# Patient Record
Sex: MALE | Race: WHITE | HISPANIC OR LATINO | URBAN - METROPOLITAN AREA
[De-identification: names, ages, dates, MRNs, and addresses within clinical notes are randomized per-mention and may not be internally consistent; named-entity substitution may affect disease eponyms.]

---

## 2018-06-01 ENCOUNTER — EMERGENCY (EMERGENCY)
Facility: HOSPITAL | Age: 38
LOS: 1 days | Discharge: HOME | End: 2018-06-01
Payer: MEDICAID

## 2018-06-01 VITALS — WEIGHT: 149.91 LBS

## 2018-06-01 VITALS
DIASTOLIC BLOOD PRESSURE: 88 MMHG | TEMPERATURE: 98 F | HEART RATE: 87 BPM | OXYGEN SATURATION: 96 % | RESPIRATION RATE: 16 BRPM | SYSTOLIC BLOOD PRESSURE: 132 MMHG

## 2018-06-01 DIAGNOSIS — Z98.89 OTHER SPECIFIED POSTPROCEDURAL STATES: Chronic | ICD-10-CM

## 2018-06-01 DIAGNOSIS — S01.01XA LACERATION WITHOUT FOREIGN BODY OF SCALP, INITIAL ENCOUNTER: Chronic | ICD-10-CM

## 2018-06-01 DIAGNOSIS — M75.102 UNSPECIFIED ROTATOR CUFF TEAR OR RUPTURE OF LEFT SHOULDER, NOT SPECIFIED AS TRAUMATIC: Chronic | ICD-10-CM

## 2018-06-01 PROCEDURE — 99283 EMERGENCY DEPT VISIT LOW MDM: CPT

## 2018-06-01 PROCEDURE — T1013: CPT

## 2018-06-01 NOTE — ED ADULT NURSE REASSESSMENT NOTE - NS ED NURSE REASSESS COMMENT FT1
Report recvd from off going RN, pt rec d standing at nursing station requesting to see Dr. ASAP, wait time explained to pt, pt assisted back to area, cooperative to care.

## 2018-06-02 NOTE — ED PROVIDER NOTE - ATTENDING CONTRIBUTION TO CARE
38y old from home with complaints of ear pain, with blood in canal, most likely from truama, plan to debrox dropa and close follow up with ent

## 2018-06-02 NOTE — ED PROVIDER NOTE - OBJECTIVE STATEMENT
37 y/o M c/o bleeding from left ear x 3 days.  Patient denies trauma or foreign body.  Denies fever.

## 2018-06-02 NOTE — ED ADULT NURSE REASSESSMENT NOTE - NS ED NURSE REASSESS COMMENT FT1
Pt returned to ED at this time after several; attempts at locating pt, pt found outside on bench talking on cell phone asked to return to dept at this time for treatment, practitioners made aware pt is back.

## 2018-08-19 ENCOUNTER — INPATIENT (INPATIENT)
Facility: HOSPITAL | Age: 38
LOS: 3 days | Discharge: HOME | DRG: 556 | End: 2018-08-23
Attending: INTERNAL MEDICINE | Admitting: INTERNAL MEDICINE
Payer: MEDICAID

## 2018-08-19 VITALS — WEIGHT: 139.99 LBS | HEIGHT: 64 IN

## 2018-08-19 DIAGNOSIS — R29.898 OTHER SYMPTOMS AND SIGNS INVOLVING THE MUSCULOSKELETAL SYSTEM: ICD-10-CM

## 2018-08-19 DIAGNOSIS — S01.01XA LACERATION WITHOUT FOREIGN BODY OF SCALP, INITIAL ENCOUNTER: Chronic | ICD-10-CM

## 2018-08-19 DIAGNOSIS — Z98.89 OTHER SPECIFIED POSTPROCEDURAL STATES: Chronic | ICD-10-CM

## 2018-08-19 DIAGNOSIS — M75.102 UNSPECIFIED ROTATOR CUFF TEAR OR RUPTURE OF LEFT SHOULDER, NOT SPECIFIED AS TRAUMATIC: Chronic | ICD-10-CM

## 2018-08-19 LAB
ALBUMIN SERPL ELPH-MCNC: 4.6 G/DL — SIGNIFICANT CHANGE UP (ref 3.3–5.2)
ALP SERPL-CCNC: 50 U/L — SIGNIFICANT CHANGE UP (ref 40–120)
ALT FLD-CCNC: 33 U/L — SIGNIFICANT CHANGE UP
AMPHET UR-MCNC: NEGATIVE — SIGNIFICANT CHANGE UP
ANION GAP SERPL CALC-SCNC: 13 MMOL/L — SIGNIFICANT CHANGE UP (ref 5–17)
ANISOCYTOSIS BLD QL: SLIGHT — SIGNIFICANT CHANGE UP
APPEARANCE CSF: CLEAR — SIGNIFICANT CHANGE UP
APPEARANCE UR: CLEAR — SIGNIFICANT CHANGE UP
APTT BLD: 34.5 SEC — SIGNIFICANT CHANGE UP (ref 27.5–37.4)
AST SERPL-CCNC: 38 U/L — SIGNIFICANT CHANGE UP
BACTERIA # UR AUTO: ABNORMAL
BARBITURATES UR SCN-MCNC: NEGATIVE — SIGNIFICANT CHANGE UP
BENZODIAZ UR-MCNC: NEGATIVE — SIGNIFICANT CHANGE UP
BILIRUB SERPL-MCNC: 0.5 MG/DL — SIGNIFICANT CHANGE UP (ref 0.4–2)
BILIRUB UR-MCNC: NEGATIVE — SIGNIFICANT CHANGE UP
BUN SERPL-MCNC: 10 MG/DL — SIGNIFICANT CHANGE UP (ref 8–20)
CALCIUM SERPL-MCNC: 9.6 MG/DL — SIGNIFICANT CHANGE UP (ref 8.6–10.2)
CHLORIDE SERPL-SCNC: 100 MMOL/L — SIGNIFICANT CHANGE UP (ref 98–107)
CO2 SERPL-SCNC: 27 MMOL/L — SIGNIFICANT CHANGE UP (ref 22–29)
COCAINE METAB.OTHER UR-MCNC: NEGATIVE — SIGNIFICANT CHANGE UP
COLOR CSF: SIGNIFICANT CHANGE UP
COLOR SPEC: YELLOW — SIGNIFICANT CHANGE UP
CREAT SERPL-MCNC: 0.65 MG/DL — SIGNIFICANT CHANGE UP (ref 0.5–1.3)
CSF PCR RESULT: SIGNIFICANT CHANGE UP
DIFF PNL FLD: ABNORMAL
EOSINOPHIL NFR BLD AUTO: 2 % — SIGNIFICANT CHANGE UP (ref 0–6)
EPI CELLS # UR: SIGNIFICANT CHANGE UP
ETHANOL SERPL-MCNC: <10 MG/DL — SIGNIFICANT CHANGE UP
GLUCOSE CSF-MCNC: 70 MG/DL — SIGNIFICANT CHANGE UP (ref 40–70)
GLUCOSE SERPL-MCNC: 103 MG/DL — SIGNIFICANT CHANGE UP (ref 70–115)
GLUCOSE UR QL: NEGATIVE MG/DL — SIGNIFICANT CHANGE UP
GRAM STN FLD: SIGNIFICANT CHANGE UP
HCT VFR BLD CALC: 44.8 % — SIGNIFICANT CHANGE UP (ref 42–52)
HGB BLD-MCNC: 15.2 G/DL — SIGNIFICANT CHANGE UP (ref 14–18)
HYPOCHROMIA BLD QL: SLIGHT — SIGNIFICANT CHANGE UP
INR BLD: 0.91 RATIO — SIGNIFICANT CHANGE UP (ref 0.88–1.16)
KETONES UR-MCNC: ABNORMAL
LEUKOCYTE ESTERASE UR-ACNC: NEGATIVE — SIGNIFICANT CHANGE UP
LIDOCAIN IGE QN: 42 U/L — SIGNIFICANT CHANGE UP (ref 22–51)
LYMPHOCYTES # BLD AUTO: 22 % — SIGNIFICANT CHANGE UP (ref 20–55)
MACROCYTES BLD QL: SLIGHT — SIGNIFICANT CHANGE UP
MCHC RBC-ENTMCNC: 33.9 G/DL — SIGNIFICANT CHANGE UP (ref 32–36)
MCHC RBC-ENTMCNC: 35 PG — HIGH (ref 27–31)
MCV RBC AUTO: 103.2 FL — HIGH (ref 80–94)
METHADONE UR-MCNC: NEGATIVE — SIGNIFICANT CHANGE UP
MICROCYTES BLD QL: SLIGHT — SIGNIFICANT CHANGE UP
MONOCYTES NFR BLD AUTO: 14 % — HIGH (ref 3–10)
NEUTROPHILS # CSF: SIGNIFICANT CHANGE UP %
NEUTROPHILS NFR BLD AUTO: 60 % — SIGNIFICANT CHANGE UP (ref 37–73)
NITRITE UR-MCNC: NEGATIVE — SIGNIFICANT CHANGE UP
NRBC NFR CSF: 1 /UL — SIGNIFICANT CHANGE UP (ref 0–5)
NT-PROBNP SERPL-SCNC: 12 PG/ML — SIGNIFICANT CHANGE UP (ref 0–300)
OPIATES UR-MCNC: NEGATIVE — SIGNIFICANT CHANGE UP
PCP SPEC-MCNC: SIGNIFICANT CHANGE UP
PCP UR-MCNC: NEGATIVE — SIGNIFICANT CHANGE UP
PH UR: 6.5 — SIGNIFICANT CHANGE UP (ref 5–8)
PLAT MORPH BLD: NORMAL — SIGNIFICANT CHANGE UP
PLATELET # BLD AUTO: 156 K/UL — SIGNIFICANT CHANGE UP (ref 150–400)
POIKILOCYTOSIS BLD QL AUTO: SLIGHT — SIGNIFICANT CHANGE UP
POTASSIUM SERPL-MCNC: 4.1 MMOL/L — SIGNIFICANT CHANGE UP (ref 3.5–5.3)
POTASSIUM SERPL-SCNC: 4.1 MMOL/L — SIGNIFICANT CHANGE UP (ref 3.5–5.3)
PROT CSF-MCNC: 45 MG/DL — SIGNIFICANT CHANGE UP (ref 15–45)
PROT SERPL-MCNC: 7.4 G/DL — SIGNIFICANT CHANGE UP (ref 6.6–8.7)
PROT UR-MCNC: 15 MG/DL
PROTHROM AB SERPL-ACNC: 10 SEC — SIGNIFICANT CHANGE UP (ref 9.8–12.7)
RBC # BLD: 4.34 M/UL — LOW (ref 4.6–6.2)
RBC # CSF: 10 /CMM — HIGH (ref 0–1)
RBC # FLD: 12.8 % — SIGNIFICANT CHANGE UP (ref 11–15.6)
RBC BLD AUTO: ABNORMAL
RBC CASTS # UR COMP ASSIST: ABNORMAL /HPF (ref 0–4)
SODIUM SERPL-SCNC: 140 MMOL/L — SIGNIFICANT CHANGE UP (ref 135–145)
SP GR SPEC: 1.01 — SIGNIFICANT CHANGE UP (ref 1.01–1.02)
SPECIMEN SOURCE: SIGNIFICANT CHANGE UP
THC UR QL: NEGATIVE — SIGNIFICANT CHANGE UP
TROPONIN T SERPL-MCNC: <0.01 NG/ML — SIGNIFICANT CHANGE UP (ref 0–0.06)
TUBE TYPE: SIGNIFICANT CHANGE UP
UROBILINOGEN FLD QL: NEGATIVE MG/DL — SIGNIFICANT CHANGE UP
VARIANT LYMPHS # BLD: 2 % — SIGNIFICANT CHANGE UP (ref 0–6)
WBC # BLD: 3.2 K/UL — LOW (ref 4.8–10.8)
WBC # FLD AUTO: 3.2 K/UL — LOW (ref 4.8–10.8)
WBC UR QL: SIGNIFICANT CHANGE UP

## 2018-08-19 PROCEDURE — 72141 MRI NECK SPINE W/O DYE: CPT | Mod: 26

## 2018-08-19 PROCEDURE — 74177 CT ABD & PELVIS W/CONTRAST: CPT | Mod: 26

## 2018-08-19 PROCEDURE — 71260 CT THORAX DX C+: CPT | Mod: 26

## 2018-08-19 PROCEDURE — 72131 CT LUMBAR SPINE W/O DYE: CPT | Mod: 26

## 2018-08-19 PROCEDURE — 62270 DX LMBR SPI PNXR: CPT

## 2018-08-19 PROCEDURE — 70450 CT HEAD/BRAIN W/O DYE: CPT | Mod: 26

## 2018-08-19 PROCEDURE — 72146 MRI CHEST SPINE W/O DYE: CPT | Mod: 26

## 2018-08-19 PROCEDURE — 72148 MRI LUMBAR SPINE W/O DYE: CPT | Mod: 26

## 2018-08-19 PROCEDURE — 99285 EMERGENCY DEPT VISIT HI MDM: CPT | Mod: 25

## 2018-08-19 PROCEDURE — 72128 CT CHEST SPINE W/O DYE: CPT | Mod: 26

## 2018-08-19 PROCEDURE — 99222 1ST HOSP IP/OBS MODERATE 55: CPT

## 2018-08-19 PROCEDURE — 71045 X-RAY EXAM CHEST 1 VIEW: CPT | Mod: 26

## 2018-08-19 PROCEDURE — 70551 MRI BRAIN STEM W/O DYE: CPT | Mod: 26

## 2018-08-19 RX ORDER — SODIUM CHLORIDE 9 MG/ML
3 INJECTION INTRAMUSCULAR; INTRAVENOUS; SUBCUTANEOUS ONCE
Qty: 0 | Refills: 0 | Status: COMPLETED | OUTPATIENT
Start: 2018-08-19 | End: 2018-08-19

## 2018-08-19 RX ORDER — SODIUM CHLORIDE 9 MG/ML
1000 INJECTION INTRAMUSCULAR; INTRAVENOUS; SUBCUTANEOUS ONCE
Qty: 0 | Refills: 0 | Status: COMPLETED | OUTPATIENT
Start: 2018-08-19 | End: 2018-08-19

## 2018-08-19 RX ORDER — ENOXAPARIN SODIUM 100 MG/ML
40 INJECTION SUBCUTANEOUS EVERY 24 HOURS
Qty: 0 | Refills: 0 | Status: DISCONTINUED | OUTPATIENT
Start: 2018-08-19 | End: 2018-08-23

## 2018-08-19 RX ADMIN — SODIUM CHLORIDE 1000 MILLILITER(S): 9 INJECTION INTRAMUSCULAR; INTRAVENOUS; SUBCUTANEOUS at 12:00

## 2018-08-19 RX ADMIN — SODIUM CHLORIDE 3 MILLILITER(S): 9 INJECTION INTRAMUSCULAR; INTRAVENOUS; SUBCUTANEOUS at 17:09

## 2018-08-19 RX ADMIN — ENOXAPARIN SODIUM 40 MILLIGRAM(S): 100 INJECTION SUBCUTANEOUS at 19:38

## 2018-08-19 RX ADMIN — SODIUM CHLORIDE 1000 MILLILITER(S): 9 INJECTION INTRAMUSCULAR; INTRAVENOUS; SUBCUTANEOUS at 12:50

## 2018-08-19 RX ADMIN — SODIUM CHLORIDE 3 MILLILITER(S): 9 INJECTION INTRAMUSCULAR; INTRAVENOUS; SUBCUTANEOUS at 12:36
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
## 2018-08-19 NOTE — ED PROVIDER NOTE - PROGRESS NOTE DETAILS
Patient consented for LP with  Ragini. Noted to have difficulty holding the pen and writing his name. Patient tolerated LP well. See procedure note. CSF specimen sent to lab. I discussed with Dr. Rodriguez (Neuro) who agrees with MR, agrees with labs sent for CSF and will see the patient tomorrow.

## 2018-08-19 NOTE — ED ADULT NURSE REASSESSMENT NOTE - COMFORT CARE
plan of care explained/side rails up/wait time explained/warm blanket provided/darkened lights/repositioned
side rails up/repositioned/wait time explained/warm blanket provided/darkened lights/plan of care explained

## 2018-08-19 NOTE — ED PROVIDER NOTE - PHYSICAL EXAMINATION
Const: Awake, alert and oriented. In no acute distress. Well appearing. Does not smell of alcohol.  HEENT: NC/AT. Moist mucous membranes.  Eyes: No scleral icterus. EOMI.  Neck:. Soft and supple. Full ROM without pain.  Cardiac: Regular rate and regular rhythm. +S1/S2. No murmurs. Peripheral pulses 2+ and symmetric. No LE edema.  Resp: Speaking in full sentences. No evidence of respiratory distress. No wheezes, rales or rhonchi.  Abd: Soft, diffusely tender mostly in the RLQ and LLQ, non-distended. Normal bowel sounds in all 4 quadrants. Ecchymosis in the RUQ.  Back: Spine midline and non-tender. L> R CVAT.  Skin: Multiple small abrasions bilateral ankles without overlying erythema or induration. No edema.  Lymph: No cervical lymphadenopathy.  Neuro: Awake, alert & oriented x 3. Moves all extremities symmetrically. 4/5 strength bilateral LE in hip flexion, DF/PF. Sensation symmetrically decreased to light touch bilateral lower extremities. 5/5  strength bilateral upper extremities. Const: Awake, alert and oriented. In no acute distress. Well appearing. Does not smell of alcohol.  HEENT: NC/AT. Moist mucous membranes.  Eyes: No scleral icterus. EOMI.  Neck:. Soft and supple. Full ROM without pain.  Cardiac: Regular rate and regular rhythm. +S1/S2. No murmurs. Peripheral pulses 2+ and symmetric. No LE edema.  Resp: Speaking in full sentences. No evidence of respiratory distress. No wheezes, rales or rhonchi.  Abd: Soft, diffusely tender mostly in the RLQ and LLQ, non-distended. Normal bowel sounds in all 4 quadrants. Ecchymosis in the RUQ.  Back: Spine midline and non-tender. L> R CVAT.  Skin: Multiple small abrasions bilateral ankles without overlying erythema or induration. No edema.  Lymph: No cervical lymphadenopathy.  Neuro: Awake, alert & oriented x 3. Moves all extremities symmetrically. 4/5 strength bilateral LE in hip flexion, DF/PF. Sensation symmetrically decreased to light touch bilateral lower extremities. 5/5  strength bilateral upper extremities. Difficulty with fine motor bilateral hands.

## 2018-08-19 NOTE — ED PROCEDURE NOTE - PROCEDURE ADDITIONAL DETAILS
20 G spinal needle introduced in L4/L5 spinal level. Clear spinal fluid was obtained. Patient tolerated procedure well. Bandaid placed. Patient instructed to lie flat for 1 hour.

## 2018-08-19 NOTE — ED ADULT TRIAGE NOTE - CHIEF COMPLAINT QUOTE
Patient is awake and oriented times 3, complains of bilateral complete foot numbness for 1 week, chest pain with palpitations for 2 weeks, insomnia for a couple of weeks, abnormal bruising/sores on his abdomen, patient states that he is currently being checked for diabetes but has gotten his test results yet, admits to drinking 1 beer per week, patient is a difficult historian
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
## 2018-08-19 NOTE — ED ADULT NURSE NOTE - NSIMPLEMENTINTERV_GEN_ALL_ED
Implemented All Universal Safety Interventions:  [place] to call system. Call bell, personal items and telephone within reach. Instruct patient to call for assistance. Room bathroom lighting operational. Non-slip footwear when patient is off stretcher. Physically safe environment: no spills, clutter or unnecessary equipment. Stretcher in lowest position, wheels locked, appropriate side rails in place.

## 2018-08-19 NOTE — ED ADULT NURSE REASSESSMENT NOTE - NSIMPLEMENTINTERV_GEN_ALL_ED
Implemented All Fall Risk Interventions:  [place] to call system. Call bell, personal items and telephone within reach. Instruct patient to call for assistance. Room bathroom lighting operational. Non-slip footwear when patient is off stretcher. Physically safe environment: no spills, clutter or unnecessary equipment. Stretcher in lowest position, wheels locked, appropriate side rails in place. Provide visual cue, wrist band, yellow gown, etc. Monitor gait and stability. Monitor for mental status changes and reorient to person, place, and time. Review medications for side effects contributing to fall risk. Reinforce activity limits and safety measures with patient and family.
Implemented All Fall Risk Interventions:  [place] to call system. Call bell, personal items and telephone within reach. Instruct patient to call for assistance. Room bathroom lighting operational. Non-slip footwear when patient is off stretcher. Physically safe environment: no spills, clutter or unnecessary equipment. Stretcher in lowest position, wheels locked, appropriate side rails in place. Provide visual cue, wrist band, yellow gown, etc. Monitor gait and stability. Monitor for mental status changes and reorient to person, place, and time. Review medications for side effects contributing to fall risk. Reinforce activity limits and safety measures with patient and family.

## 2018-08-19 NOTE — H&P ADULT - HISTORY OF PRESENT ILLNESS
The patient is a 38 year old with no known past medical history who presented to the ER with complaints of lower extremity numbness and weakness for 1 week. Progressive waekness that started in his toes and progressed to his thichs. denies assocaited back pain. He denies associated fever, recent travel or tick bites. Denies associated urinary frequency/urgency/dysuria or incontinence, denies diarrhea, constipation or fecal incontinence. He has never had anything like this before.He states that he drinks one beer every once in a while and last drank only one beer 2 days ago and prior to that he had one beer last week. He denies smoking or illicit drugs. He states that he went to a doctor recently who ordered blood work and urine and gave him medication which he has been taking regularly, but he cannot recall the medication.  In the ED, CT head, CT thoracic/lumbar/cervical spine were negative. CT chest abdomen and pelvis were negative. LP was done, CSF gram stain negative; negative for meningitis/encephalitis.

## 2018-08-19 NOTE — ED PROVIDER NOTE - MEDICAL DECISION MAKING DETAILS
Patient presents with worsening LE numbness and weakness which is appreciated on exam, unclear etiology, no trauma or recent illness, taking unclear medications at home and has unexplained ecchymosis to RUQ of abdomen and very tender abdomen with bilateral CVAT. Patient denies EtOH and does not appear intoxicated or withdrawing. He does not smell of alcohol.

## 2018-08-19 NOTE — ED ADULT NURSE REASSESSMENT NOTE - NS ED NURSE REASSESS COMMENT FT1
Report received from day RN EW, charting as noted. Pt A&Ox4 c/o b/l LE pain and weakness at this time. Pt resting comfortably, VSS, no signs of distress at this time, CM in place, awaiting bed, safety maintained, call bell in reach.
Report received from day RN Pt A&Ox4 c/o b/l LE pain and weakness at this time. Pt resting comfortably, VSS, no signs of distress at this time, CM in place, awaiting bed, safety maintained, call bell in reach.

## 2018-08-19 NOTE — ED PROVIDER NOTE - NS ED ROS FT
Const: Denies fever, chills  HEENT: Denies blurry vision, sore throat  Neck: Denies neck pain/stiffness  Resp: Denies coughing, SOB  Cardiovascular: Denies CP, palpitations, LE edema  GI: + abdominal pain. Denies nausea, vomiting, diarrhea, constipation, blood in stool  : Denies urinary frequency/urgency/dysuria, hematuria  MSK: Denies back pain  Neuro: + numbness b/l LE, + weakness b/l LE. Denies HA, dizziness  Skin: + ecchymosis. Denies rashes.

## 2018-08-19 NOTE — ED PROVIDER NOTE - OBJECTIVE STATEMENT
Patient with no known PMH Presents complaining of 1 week of bilateral LE weakness and numbness which started intermittently earlier this week and around 4 days ago became constant and is gradually worsening. He states it feels like his legs are sleeping. He has not fallen, but feels that he is about to fall over when he is walking. He became concerned as it is not getting better. He also states he feels very tremulous and anxious and is having abdominal pain and flank pain. He notes disc herniations, but states his pain is mostly left sided. He is unsure how he sustained a bruise to his right upper abdomen as he did not fall. He denies recent fevers, chills, sore throat, runny nose, HA, dizziness, chest pain, SOB, nausea, vomiting, urinary frequency/urgency/dysuria or incontinence, denies diarrhea, constipation or fecal incontinence. He has never had anything like this before. 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 He states that he drinks one beer every once in a while and last drank only one beer 2 days ago and prior to that he had one beer last week. He denies smoking or illicit drugs. He states that he went to a doctor recently who ordered blood work and urine and gave him medication which he has been taking regularly, but he cannot recall the medication. He takes no over the counter supplements. His father () had DM and HTN. He denies any other family history. He denies allergies to medications.

## 2018-08-19 NOTE — H&P ADULT - ASSESSMENT
The patient is a 38 year old with no known past medical history who presented to the ER with complaints of lower extremity numbness and weakness for 1 week. Progressive waekness that started in his toes and progressed to his thichs. denies assocaited back pain. He denies associated fever, recent travel or tick bites. Denies associated urinary frequency/urgency/dysuria or incontinence, denies diarrhea, constipation or fecal incontinence. He has never had anything like this before.He states that he drinks one beer every once in a while and last drank only one beer 2 days ago and prior to that he had one beer last week. He denies smoking or illicit drugs. He states that he went to a doctor recently who ordered blood work and urine and gave him medication which he has been taking regularly, but he cannot recall the medication.  In the ED, CT head, CT thoracic/lumbar/cervical spine were negative. CT chest abdomen and pelvis were negative. LP was done, CSF gram stain negative; negative for meningitis/encephalitis.     Assessment/plan:    1. Progressive lower extremity weakness: Unclear etiology; Per Ed neurology consulted. MR of the head, c/t/l spines ordered.  neurochecks q4 hours  CSF culture for [place] virus and tick panel ordered; Rule out Lyme    2. Macrocytic Anemia; Check B12 and folate levels    3. Diabetes mellitus type 2? Check Hba1c. Monitor bsl    VTE- lovenox subcut.

## 2018-08-20 LAB
ANION GAP SERPL CALC-SCNC: 14 MMOL/L — SIGNIFICANT CHANGE UP (ref 5–17)
BUN SERPL-MCNC: 8 MG/DL — SIGNIFICANT CHANGE UP (ref 8–20)
CALCIUM SERPL-MCNC: 9.4 MG/DL — SIGNIFICANT CHANGE UP (ref 8.6–10.2)
CHLORIDE SERPL-SCNC: 96 MMOL/L — LOW (ref 98–107)
CO2 SERPL-SCNC: 26 MMOL/L — SIGNIFICANT CHANGE UP (ref 22–29)
CREAT SERPL-MCNC: 0.55 MG/DL — SIGNIFICANT CHANGE UP (ref 0.5–1.3)
FOLATE SERPL-MCNC: >20 NG/ML — SIGNIFICANT CHANGE UP
GLUCOSE SERPL-MCNC: 88 MG/DL — SIGNIFICANT CHANGE UP (ref 70–115)
HBA1C BLD-MCNC: 5.2 % — SIGNIFICANT CHANGE UP (ref 4–5.6)
HCT VFR BLD CALC: 44.9 % — SIGNIFICANT CHANGE UP (ref 42–52)
HGB BLD-MCNC: 15.9 G/DL — SIGNIFICANT CHANGE UP (ref 14–18)
HIV 1 & 2 AB SERPL IA.RAPID: SIGNIFICANT CHANGE UP
MCHC RBC-ENTMCNC: 35.4 G/DL — SIGNIFICANT CHANGE UP (ref 32–36)
MCHC RBC-ENTMCNC: 36 PG — HIGH (ref 27–31)
MCV RBC AUTO: 101.6 FL — HIGH (ref 80–94)
PLATELET # BLD AUTO: 141 K/UL — LOW (ref 150–400)
POTASSIUM SERPL-MCNC: 4.2 MMOL/L — SIGNIFICANT CHANGE UP (ref 3.5–5.3)
POTASSIUM SERPL-SCNC: 4.2 MMOL/L — SIGNIFICANT CHANGE UP (ref 3.5–5.3)
RBC # BLD: 4.42 M/UL — LOW (ref 4.6–6.2)
RBC # FLD: 12.4 % — SIGNIFICANT CHANGE UP (ref 11–15.6)
SODIUM SERPL-SCNC: 136 MMOL/L — SIGNIFICANT CHANGE UP (ref 135–145)
VIT B12 SERPL-MCNC: 408 PG/ML — SIGNIFICANT CHANGE UP (ref 232–1245)
WBC # BLD: 3.7 K/UL — LOW (ref 4.8–10.8)
WBC # FLD AUTO: 3.7 K/UL — LOW (ref 4.8–10.8)

## 2018-08-20 PROCEDURE — 99232 SBSQ HOSP IP/OBS MODERATE 35: CPT

## 2018-08-20 RX ORDER — PREGABALIN 225 MG/1
1000 CAPSULE ORAL DAILY
Qty: 0 | Refills: 0 | Status: COMPLETED | OUTPATIENT
Start: 2018-08-20 | End: 2018-08-22

## 2018-08-20 RX ADMIN — ENOXAPARIN SODIUM 40 MILLIGRAM(S): 100 INJECTION SUBCUTANEOUS at 17:06

## 2018-08-20 RX ADMIN — PREGABALIN 1000 MICROGRAM(S): 225 CAPSULE ORAL at 12:00

## 2018-08-20 NOTE — CONSULT NOTE ADULT - SUBJECTIVE AND OBJECTIVE BOX
CHIEF COMPLAINT: weakness    HPI: 38yMale    The patient is a 38 year old with no known past medical history who presented to the ER with complaints of lower extremity numbness and weakness for 1 week. Progressive waekness that started in his toes and progressed to his thichs. denies assocaited back pain. He denies associated fever, recent travel or tick bites. Denies associated urinary frequency/urgency/dysuria or incontinence, denies diarrhea, constipation or fecal incontinence. He has never had anything like this before.He states that he drinks one beer every once in a while and last drank only one beer 2 days ago and prior to that he had one beer last week. He denies smoking or illicit drugs. He states that he went to a doctor recently who ordered blood work and urine and gave him medication which he has been taking regularly, but he cannot recall the medication.  In the ED, CT head, CT thoracic/lumbar/cervical spine were negative. CT chest abdomen and pelvis were negative. LP was done, CSF gram stain negative; negative for meningitis/encephalitis.   PAST MEDICAL & SURGICAL HISTORY:  No pertinent past medical history  S/P shoulder surgery: left  Left rotator cuff tear  Laceration of scalp: surgical repair- 2010    MEDICATIONS  (STANDING):  cyanocobalamin Injectable 1000 MICROGram(s) IntraMuscular daily  enoxaparin Injectable 40 milliGRAM(s) SubCutaneous every 24 hours    MEDICATIONS  (PRN):    Allergies    No Known Drug Allergies  pork (Rash)  pork (Short breath)    Intolerances        FAMILY HISTORY:  No pertinent family history in first degree relatives  No pertinent family history in first degree relatives          SOCIAL HISTORY:    Tobacco:  no  Alcohol:  yes  Drugs:   no        REVIEW OF SYSTEMS:    Relevant systems are negative except as noted in the chart, HPI, and PMH      VITAL SIGNS:  Vital Signs Last 24 Hrs  T(C): 36.8 (20 Aug 2018 07:51), Max: 37.1 (19 Aug 2018 14:06)  T(F): 98.3 (20 Aug 2018 07:51), Max: 98.7 (19 Aug 2018 14:06)  HR: 57 (20 Aug 2018 07:51) (55 - 69)  BP: 118/77 (20 Aug 2018 07:51) (118/77 - 152/96)  BP(mean): --  RR: 18 (20 Aug 2018 07:51) (16 - 18)  SpO2: 98% (20 Aug 2018 07:51) (98% - 100%)    PHYSICAL EXAMINATION:    General: Well-developed, well nourished, in no acute distress.  Cardiac:  Regular rate and rhythm. No carotid bruits appreciated.  Eyes: Fundoscopic examination was deferred.  Neurologic:  - Mental Status:  Alert, awake, oriented to person, place, and time; Speech is fluent. Language is normal. Follows commands well.  Insight and knowledge appear appropriate.  Cranial Nerves II-XII:    II:  Visual acuity is normal for age ; Visual fields are full to confrontation; Pupils are equal, round, and reactive to light.  III, IV, VI:  Extraocular movements are intact without nystagmus.  V:  Facial sensation is intact in the V1-V3 distribution bilaterally.  VII:  Face is symmetric with normal eye closure and smile  VIII:  Hearing is grossly intact  IX, X, XII:  speech is clear  XI:  Head turning and shoulder shrug are intact.  - Motor:  Strength is 5/5 x 4.   There is no pronator drift. .  - Reflexes:  2+ and symmetric at the knees.  Plantar responses flexor.  - Sensory:  Symmetric to light touch  - Coordination:  Finger-nose-finger is normal. Rapid alternating hand and foot  movements are intact. Dexterity appears normal      LABS:                          15.9   3.7   )-----------( 141      ( 20 Aug 2018 06:45 )             44.9     20 Aug 2018 06:45    136    |  96     |  8.0    ----------------------------<  88     4.2     |  26.0   |  0.55     Ca    9.4        20 Aug 2018 06:45    TPro  7.4    /  Alb  4.6    /  TBili  0.5    /  DBili  x      /  AST  38     /  ALT  33     /  AlkPhos  50     19 Aug 2018 12:23    LIVER FUNCTIONS - ( 19 Aug 2018 12:23 )  Alb: 4.6 g/dL / Pro: 7.4 g/dL / ALK PHOS: 50 U/L / ALT: 33 U/L / AST: 38 U/L / GGT: x           PT/INR - ( 19 Aug 2018 12:23 )   PT: 10.0 sec;   INR: 0.91 ratio         PTT - ( 19 Aug 2018 12:23 )  PTT:34.5 sec  CSF Appearance: Clear (08-19-18 @ 14:44)  CSF Color: No Color (08-19-18 @ 14:44)  CSF Segmented Neutrophils: N/A % (08-19-18 @ 14:44)  Glucose, CSF: 70 mg/dL (08-19-18 @ 14:44)  Protein, CSF: 45 mg/dL (08-19-18 @ 14:44)    Vitamin B12, Serum: 408 pg/mL (08.20.18 @ 06:45)      RADIOLOGY & ADDITIONAL STUDIES:    MR head and entire spine -neg  CSF so far neg  LAbs-  B12 - borderline normal    IMPRESSION:      PLAN:  1. Repeat B12, homocysteine, methylmalonic acid, other labs as orderd  2.   3.   4.  5. CHIEF COMPLAINT: weakness    HPI: 38yMale    The patient is a 38 year old with no known past medical history who presented to the ER with complaints of lower extremity numbness and weakness for 1 week. Progressive weakness that started in his toes and progressed to his thighs denies associated back pain. He denies associated fever, recent travel or tick bites. Denies associated urinary frequency/urgency/dysuria or incontinence, denies diarrhea, constipation or fecal incontinence. He has never had anything like this before. He states that he drinks one beer every once in a while and last drank only one beer 2 days ago and prior to that he had one beer last week. He denies smoking or illicit drugs. He states that he went to a doctor recently who ordered blood work and urine and gave him medication which he has been taking regularly, but he cannot recall the medication.  In the ED, CT head, CT thoracic/lumbar/cervical spine were negative. CT chest abdomen and pelvis were negative. LP was done, CSF gram stain negative; negative for meningitis/encephalitis.     To me he denies weakness, but rather numbness and tingling.  No weight loss. Mild chronic back pain only      PAST MEDICAL & SURGICAL HISTORY:  No pertinent past medical history  S/P shoulder surgery: left  Left rotator cuff tear  Laceration of scalp: surgical repair- 2010    MEDICATIONS  (STANDING):  cyanocobalamin Injectable 1000 MICROGram(s) IntraMuscular daily  enoxaparin Injectable 40 milliGRAM(s) SubCutaneous every 24 hours    MEDICATIONS  (PRN):    Allergies    No Known Drug Allergies  pork (Rash)  pork (Short breath)    Intolerances        FAMILY HISTORY:  No pertinent family history in first degree relatives  No pertinent family history in first degree relatives          SOCIAL HISTORY:    Tobacco:  no  Alcohol:  yes  Drugs:   no        REVIEW OF SYSTEMS:    Relevant systems are negative except as noted in the chart, HPI, and PMH      VITAL SIGNS:  Vital Signs Last 24 Hrs  T(C): 36.8 (20 Aug 2018 07:51), Max: 37.1 (19 Aug 2018 14:06)  T(F): 98.3 (20 Aug 2018 07:51), Max: 98.7 (19 Aug 2018 14:06)  HR: 57 (20 Aug 2018 07:51) (55 - 69)  BP: 118/77 (20 Aug 2018 07:51) (118/77 - 152/96)  BP(mean): --  RR: 18 (20 Aug 2018 07:51) (16 - 18)  SpO2: 98% (20 Aug 2018 07:51) (98% - 100%)    PHYSICAL EXAMINATION:    General: Well-developed, well nourished, in no acute distress.  Cardiac:  Regular rate and rhythm. No carotid bruits appreciated.  Eyes: Fundoscopic examination was deferred.  Neurologic:  - Mental Status:  Alert, awake, oriented to person, place, and time; Speech is fluent. Language is normal. Follows commands well.  Insight and knowledge appear appropriate.  Cranial Nerves II-XII:    II:  Visual acuity is normal for age ; Visual fields are full to confrontation; Pupils are equal, round, and reactive to light.  III, IV, VI:  Extraocular movements are intact without nystagmus.  V:  Facial sensation is intact in the V1-V3 distribution bilaterally.  VII:  Face is symmetric with normal eye closure and smile  VIII:  Hearing is grossly intact  IX, X, XII:  speech is clear  XI:  Head turning and shoulder shrug are intact.  - Motor:  Strength is 5/5 x 4.   There is no pronator drift. .  - Reflexes: Absent at knees and ankles. Trace - bicpes  Plantar responses flexor.  - Sensory:  Symmetric to light touch,  very poor proprioception- fingers and toes  - Coordination:  Finger-nose-finger is normal. Rapid alternating hand and foot  movements are intact. Dexterity appears normal      LABS:                          15.9   3.7   )-----------( 141      ( 20 Aug 2018 06:45 )             44.9     20 Aug 2018 06:45    136    |  96     |  8.0    ----------------------------<  88     4.2     |  26.0   |  0.55     Ca    9.4        20 Aug 2018 06:45    TPro  7.4    /  Alb  4.6    /  TBili  0.5    /  DBili  x      /  AST  38     /  ALT  33     /  AlkPhos  50     19 Aug 2018 12:23    LIVER FUNCTIONS - ( 19 Aug 2018 12:23 )  Alb: 4.6 g/dL / Pro: 7.4 g/dL / ALK PHOS: 50 U/L / ALT: 33 U/L / AST: 38 U/L / GGT: x           PT/INR - ( 19 Aug 2018 12:23 )   PT: 10.0 sec;   INR: 0.91 ratio         PTT - ( 19 Aug 2018 12:23 )  PTT:34.5 sec  CSF Appearance: Clear (08-19-18 @ 14:44)  CSF Color: No Color (08-19-18 @ 14:44)  CSF Segmented Neutrophils: N/A % (08-19-18 @ 14:44)  Glucose, CSF: 70 mg/dL (08-19-18 @ 14:44)  Protein, CSF: 45 mg/dL (08-19-18 @ 14:44)    Vitamin B12, Serum: 408 pg/mL (08.20.18 @ 06:45)      RADIOLOGY & ADDITIONAL STUDIES:    MR head and entire spine -neg  CSF so far neg  LAbs-  B12 - borderline normal    IMPRESSION:  Two week history of numbness below the knees. His exam shows loss of reflexes and proprioception.   MRI studies and CSF (so far) is normal.    Exam suggests neuropathic or posterior column process.     PLAN:  1. Repeat B12, homocysteine, methylmalonic acid, other labs as ordered  2. B12 injections 1000mcg IM x 3 days.  3. EMG- not available here.  Can be done as outpatient or transfer to office  4.  5.

## 2018-08-21 LAB
ALBUMIN CSF-MCNC: 27.4 MG/DL — HIGH (ref 14–25)
ALBUMIN SERPL ELPH-MCNC: 4328 MG/DL — SIGNIFICANT CHANGE UP (ref 3500–5200)
DSDNA AB FLD-ACNC: <0.2 AI — SIGNIFICANT CHANGE UP
ENA SS-A AB FLD IA-ACNC: <0.2 AI — SIGNIFICANT CHANGE UP
HCT VFR BLD CALC: 47.6 % — SIGNIFICANT CHANGE UP (ref 42–52)
HCYS SERPL-MCNC: 7.4 UMOL/L — SIGNIFICANT CHANGE UP (ref 5–15)
HGB BLD-MCNC: 15.8 G/DL — SIGNIFICANT CHANGE UP (ref 14–18)
IGG CSF-MCNC: 4.1 MG/DL — HIGH
IGG FLD-MCNC: 1033 MG/DL — SIGNIFICANT CHANGE UP (ref 610–1660)
IGG SYNTH RATE SER+CSF CALC-MRATE: 2.1 MG/DAY — SIGNIFICANT CHANGE UP
IGG/ALB CLEAR SER+CSF-RTO: 0.6 — SIGNIFICANT CHANGE UP
IGG/ALB CSF: 0.15 RATIO — SIGNIFICANT CHANGE UP
IGG/ALB SER: 0.24 RATIO — SIGNIFICANT CHANGE UP
MCHC RBC-ENTMCNC: 33.2 G/DL — SIGNIFICANT CHANGE UP (ref 32–36)
MCHC RBC-ENTMCNC: 34.3 PG — HIGH (ref 27–31)
MCV RBC AUTO: 103.3 FL — HIGH (ref 80–94)
PLATELET # BLD AUTO: 171 K/UL — SIGNIFICANT CHANGE UP (ref 150–400)
RBC # BLD: 4.61 M/UL — SIGNIFICANT CHANGE UP (ref 4.6–6.2)
RBC # FLD: 12.4 % — SIGNIFICANT CHANGE UP (ref 11–15.6)
T PALLIDUM AB TITR SER: NEGATIVE — SIGNIFICANT CHANGE UP
WBC # BLD: 5.2 K/UL — SIGNIFICANT CHANGE UP (ref 4.8–10.8)
WBC # FLD AUTO: 5.2 K/UL — SIGNIFICANT CHANGE UP (ref 4.8–10.8)

## 2018-08-21 PROCEDURE — 99233 SBSQ HOSP IP/OBS HIGH 50: CPT

## 2018-08-21 RX ADMIN — PREGABALIN 1000 MICROGRAM(S): 225 CAPSULE ORAL at 12:15

## 2018-08-21 RX ADMIN — ENOXAPARIN SODIUM 40 MILLIGRAM(S): 100 INJECTION SUBCUTANEOUS at 17:46

## 2018-08-22 LAB
A PHAGOCYTOPH IGG TITR SER IF: SIGNIFICANT CHANGE UP TITER
ACE SERPL-CCNC: 55 U/L — SIGNIFICANT CHANGE UP (ref 14–82)
ANA TITR SER: NEGATIVE — SIGNIFICANT CHANGE UP
B BURGDOR AB SER QL IA: NEGATIVE — SIGNIFICANT CHANGE UP
B MICROTI IGG TITR SER: SIGNIFICANT CHANGE UP TITER
E CHAFFEENSIS IGG TITR SER IF: SIGNIFICANT CHANGE UP TITER

## 2018-08-22 PROCEDURE — 99232 SBSQ HOSP IP/OBS MODERATE 35: CPT

## 2018-08-22 RX ADMIN — ENOXAPARIN SODIUM 40 MILLIGRAM(S): 100 INJECTION SUBCUTANEOUS at 17:47

## 2018-08-22 RX ADMIN — PREGABALIN 1000 MICROGRAM(S): 225 CAPSULE ORAL at 15:10

## 2018-08-22 NOTE — PROGRESS NOTE ADULT - ASSESSMENT
· Assessment	  The patient is a 38 year old with no known past medical history who presented to the ER with complaints of lower extremity numbness and weakness for 1 week. Progressive weakness that started in his toes and progressed to his thighs denies associated back pain. He denies associated fever, recent travel or tick bites. Denies associated urinary frequency/urgency/dysuria or incontinence, denies diarrhea, constipation or fecal incontinence. He has never had anything like this before. H states that he drinks one beer every once in a while and last drank only one beer 2 days ago and prior to that he had one beer last week. He denies smoking or illicit drugs. He states that he went to a doctor recently who ordered blood work and urine and gave him medication which he has been taking regularly, but he cannot recall the medication.  In the ED, CT head, CT thoracic/lumbar/cervical spine were negative. CT chest abdomen and pelvis were negative. LP was done, CSF gram stain negative; negative for meningitis/encephalitis.     1. Progressive lower extremity weakness: Unclear etiology;  MR of the head, c/t/l spines negative.   Neurology dr Tineo recommend and EMG possible to be done by the [facility].  I discussed with [facility] who has possibility of doing EMG in their office since patient has no capacity to pay and has no insurance.  Dr Tara Brooke is getting back to me on the matter.  Dr Tineo is considering EMG for rule out Guillain [place] syndrome, neuropathy that may necessitate IVIG therapy.   B12 injections x 3 days  Homocysteine MMA ordered  neuro checks q6 hours  CSF culture for [place] virus and tick panel ordered; Rule out Lyme pending labs     2. Macrocytic Anemia; Homocysteine and methylmalonic acid levels ordered.   B12 1000mg X3 days per neurology  l
The patient is a 38 year old with no known past medical history who presented to the ER with complaints of lower extremity numbness and weakness for 1 week. Progressive weakness that started in his toes and progressed to his thighs denies associated back pain. He denies associated fever, recent travel or tick bites. Denies associated urinary frequency/urgency/dysuria or incontinence, denies diarrhea, constipation or fecal incontinence. He has never had anything like this before. H states that he drinks one beer every once in a while and last drank only one beer 2 days ago and prior to that he had one beer last week. He denies smoking or illicit drugs. He states that he went to a doctor recently who ordered blood work and urine and gave him medication which he has been taking regularly, but he cannot recall the medication.  In the ED, CT head, CT thoracic/lumbar/cervical spine were negative. CT chest abdomen and pelvis were negative. LP was done, CSF gram stain negative; negative for meningitis/encephalitis.     1. Progressive lower extremity weakness: Now improved.  Unclear etiology;  MR of the head, c/t/l spines negative.   Neurology dr Tineo recommend and EMG possible to be done by the [facility].   Dr Tineo is considering EMG for rule out Guillain [place] syndrome, neuropathy that may necessitate IVIG therapy. [facility] [place] consulted.   B12 injections x 3 days completed  PM&R consult     2. Macrocytic Anemia; Homocysteine and methylmalonic acid levels ordered.   B12 1000mg X3 days per neurology
The patient is a 38 year old with no known past medical history who presented to the ER with complaints of lower extremity numbness and weakness for 1 week. Progressive weakness that started in his toes and progressed to his thighs denies associated back pain. He denies associated fever, recent travel or tick bites. Denies associated urinary frequency/urgency/dysuria or incontinence, denies diarrhea, constipation or fecal incontinence. He has never had anything like this before. H states that he drinks one beer every once in a while and last drank only one beer 2 days ago and prior to that he had one beer last week. He denies smoking or illicit drugs. He states that he went to a doctor recently who ordered blood work and urine and gave him medication which he has been taking regularly, but he cannot recall the medication.  In the ED, CT head, CT thoracic/lumbar/cervical spine were negative. CT chest abdomen and pelvis were negative. LP was done, CSF gram stain negative; negative for meningitis/encephalitis.     Assessment/plan:    1. Progressive lower extremity weakness: Unclear etiology;  MR of the head, c/t/l spines negative.   Neurology following.   B12 injections x 3 days  Homocysteine MMA ordered  neuro checks q4 hours  CSF culture for [place] virus and tick panel ordered; Rule out Lyme pending labs     2. Macrocytic Anemia; Homocysteine and methylmalonic acid levels ordered.   B12 1000mg X3 days per neurology  l  VTE- lovenox subcut.
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
## 2018-08-22 NOTE — CONSULT NOTE ADULT - SUBJECTIVE AND OBJECTIVE BOX
Patient is a 38y old  Male who presents with a chief complaint of Bilateral leg weakness (19 Aug 2018 17:42)      HPI: 37 y/o male, with no significant PMHx, presents to ED on 08/19/18 c/o a 1 week history of worsening B/L lower extremity numbness and weakness. Weakness began at his toes and ascended to his thighs. Pt. Denies back pain, fever, recent travel, or tick bites. Denies associated urinary frequency/urgency/dysuria or incontinence, diarrhea, constipation or fecal incontinence. He states that he went to a doctor recently who ordered blood work and urine and gave him medication which he has been taking regularly, but he cannot recall the medication.  In the ED, CT head, CT thoracic/lumbar/cervical spine were negative. CT chest abdomen and pelvis were negative. LP was done, CSF gram stain negative; negative for meningitis/encephalitis. Labs showed Macrocytic anemia, with normal B12,Homocysteine, and Methylmalonic acid levels.        ------------------------------------  Imaging showed:  HEAD CT - No acute findings  CAP CT - No acute findings  C SPINE CT - No acute findings    REVIEW OF SYSTEMS  Constitutional - No fever, No weight loss, No fatigue  HEENT - No eye pain, No visual disturbances, No difficulty hearing, No tinnitus, No vertigo, No neck pain  Respiratory - No cough, No wheezing, No shortness of breath  Cardiovascular - No chest pain, No palpitations  Gastrointestinal - No abdominal pain, No nausea, No vomiting, No diarrhea, No constipation  Genitourinary - No dysuria, No frequency, No hematuria, No incontinence  Neurological - No headaches, No memory loss, No loss of strength, No numbness, No tremors  Skin - No itching, No rashes, No lesions   Endocrine - No temperature intolerance  Musculoskeletal - No joint pain, No joint swelling, No muscle pain  Psychiatric - No depression, No anxiety    VITALS  T(C): 36.7 (08-22-18 @ 07:45), Max: 36.9 (08-21-18 @ 23:58)  HR: 65 (08-22-18 @ 07:45) (63 - 89)  BP: 125/82 (08-22-18 @ 07:45) (93/60 - 132/88)  RR: 18 (08-22-18 @ 07:45) (16 - 18)  SpO2: 100% (08-22-18 @ 07:45) (94% - 100%)  Wt(kg): --    PAST MEDICAL & SURGICAL HISTORY  No pertinent past medical history  S/P shoulder surgery  Left rotator cuff tear  Laceration of scalp  Laceration of scapula      SOCIAL HISTORY  Smoking - Denied  EtOH - Denied   Drugs - Denied    FUNCTIONAL HISTORY  Lives   Independent    CURRENT FUNCTIONAL STATUS      FAMILY HISTORY   No pertinent family history in first degree relatives  No pertinent family history in first degree relatives      RECENT LABS/IMAGING  CBC Full  -  ( 21 Aug 2018 07:22 )  WBC Count : 5.2 K/uL  Hemoglobin : 15.8 g/dL  Hematocrit : 47.6 %  Platelet Count - Automated : 171 K/uL  Mean Cell Volume : 103.3 fl  Mean Cell Hemoglobin : 34.3 pg  Mean Cell Hemoglobin Concentration : 33.2 g/dL  Auto Neutrophil # : x  Auto Lymphocyte # : x  Auto Monocyte # : x  Auto Eosinophil # : x  Auto Basophil # : x  Auto Neutrophil % : x  Auto Lymphocyte % : x  Auto Monocyte % : x  Auto Eosinophil % : x  Auto Basophil % : x        TPro  x   /  Alb  4328  /  TBili  x   /  DBili  x   /  AST  x   /  ALT  x   /  AlkPhos  x   08-20        ALLERGIES  No Known Drug Allergies  pork (Rash)  pork (Short breath)      MEDICATIONS   cyanocobalamin Injectable 1000 MICROGram(s) IntraMuscular daily  enoxaparin Injectable 40 milliGRAM(s) SubCutaneous every 24 hours Patient is a 38y old  Male who presents with a chief complaint of Bilateral leg weakness (19 Aug 2018 17:42)      HPI: 37 y/o male, with no significant PMHx, presents to ED on 08/19/18 c/o a 1 week history of worsening B/L lower extremity numbness and weakness. Weakness began at his toes and ascended to his thighs. Pt. Denies back pain, fever, recent travel, or tick bites. Denies associated urinary frequency/urgency/dysuria or incontinence, diarrhea, constipation or fecal incontinence. He states that he went to a doctor recently who ordered blood work and urine and gave him medication which he has been taking regularly, but he cannot recall the medication.  In the ED, CT head, CT thoracic/lumbar/cervical spine were negative. CT chest abdomen and pelvis were negative. LP was done, CSF gram stain negative; negative for meningitis/encephalitis. Labs showed Macrocytic anemia, with normal B12,Homocysteine, and Methylmalonic acid levels.        ------------------------------------  Imaging showed:  HEAD CT - No acute intracranial hemorrhage or acute territorial infarct.    CT Lumber chest/Abd/Pelvs:   No evidence of acute thoracic or abdominal visceral injury.  No acute bony fractures.    08/19/18  MRI Lumbar:  No evidence for lower thoracic spinal cord compression or cauda equina   compression. Nonspecific right paraspinal soft tissue edema is noted with   differential diagnosis as described.    MRI Cervical:  No acute abnormality is noted in the cervical spine.    MRI Thoracic:  No acute abnormality is noted in the thoracic spine.    MRI Head   No evidence of intracranial mass, infarct, hemorrhage, or hydrocephalus.      REVIEW OF SYSTEMS  Constitutional - No fever, No weight loss, No fatigue  HEENT - No eye pain, No visual disturbances, No difficulty hearing, No tinnitus, No vertigo, No neck pain  Respiratory - No cough, No wheezing, No shortness of breath  Cardiovascular - No chest pain, No palpitations  Gastrointestinal - No abdominal pain, No nausea, No vomiting, No diarrhea, No constipation  Genitourinary - No dysuria, No frequency, No hematuria, No incontinence  Neurological - No headaches, No memory loss, No loss of strength, No numbness, No tremors  Skin - No itching, No rashes, No lesions   Endocrine - No temperature intolerance  Musculoskeletal - No joint pain, No joint swelling, No muscle pain  Psychiatric - No depression, No anxiety    VITALS  T(C): 36.7 (08-22-18 @ 07:45), Max: 36.9 (08-21-18 @ 23:58)  HR: 65 (08-22-18 @ 07:45) (63 - 89)  BP: 125/82 (08-22-18 @ 07:45) (93/60 - 132/88)  RR: 18 (08-22-18 @ 07:45) (16 - 18)  SpO2: 100% (08-22-18 @ 07:45) (94% - 100%)  Wt(kg): --    PAST MEDICAL & SURGICAL HISTORY  No pertinent past medical history  S/P shoulder surgery  Left rotator cuff tear  Laceration of scalp  Laceration of scapula      SOCIAL HISTORY  Smoking - Denied  EtOH - Denied   Drugs - Denied    FUNCTIONAL HISTORY  Lives   Independent    CURRENT FUNCTIONAL STATUS      FAMILY HISTORY   No pertinent family history in first degree relatives  No pertinent family history in first degree relatives      RECENT LABS/IMAGING  CBC Full  -  ( 21 Aug 2018 07:22 )  WBC Count : 5.2 K/uL  Hemoglobin : 15.8 g/dL  Hematocrit : 47.6 %  Platelet Count - Automated : 171 K/uL  Mean Cell Volume : 103.3 fl  Mean Cell Hemoglobin : 34.3 pg  Mean Cell Hemoglobin Concentration : 33.2 g/dL  Auto Neutrophil # : x  Auto Lymphocyte # : x  Auto Monocyte # : x  Auto Eosinophil # : x  Auto Basophil # : x  Auto Neutrophil % : x  Auto Lymphocyte % : x  Auto Monocyte % : x  Auto Eosinophil % : x  Auto Basophil % : x        TPro  x   /  Alb  4328  /  TBili  x   /  DBili  x   /  AST  x   /  ALT  x   /  AlkPhos  x   08-20        ALLERGIES  No Known Drug Allergies  pork (Rash)  pork (Short breath)      MEDICATIONS   cyanocobalamin Injectable 1000 MICROGram(s) IntraMuscular daily  enoxaparin Injectable 40 milliGRAM(s) SubCutaneous every 24 hours Patient is a 38y old  Male who presents with a chief complaint of Bilateral leg weakness (19 Aug 2018 17:42)      HPI: 37 y/o male, with no significant PMHx, presents to ED on 08/19/18 c/o a 1 week history of worsening B/L lower extremity numbness and weakness. Weakness began at his toes and ascended to his thighs. Pt. Denies back pain, fever, recent travel, or tick bites. Denies associated urinary frequency/urgency/dysuria or incontinence, diarrhea, constipation or fecal incontinence. He states that he went to a doctor recently who ordered blood work and urine and gave him medication which he has been taking regularly, but he cannot recall the medication.  In the ED, CT head, CT thoracic/lumbar/cervical spine were negative. CT chest abdomen and pelvis were negative. LP was done, CSF gram stain negative; negative for meningitis/encephalitis. Labs showed Macrocytic anemia, with normal B12,Homocysteine, and Methylmalonic acid levels. Patient has received B12 injections for 3 days and plans are being explored for EMG.        ------------------------------------  Imaging showed:  HEAD CT - No acute intracranial hemorrhage or acute territorial infarct.    CT Lumber chest/Abd/Pelvs:   No evidence of acute thoracic or abdominal visceral injury.  No acute bony fractures.    08/19/18  MRI Lumbar:  No evidence for lower thoracic spinal cord compression or cauda equina   compression. Nonspecific right paraspinal soft tissue edema is noted with   differential diagnosis as described.    MRI Cervical:  No acute abnormality is noted in the cervical spine.    MRI Thoracic:  No acute abnormality is noted in the thoracic spine.    MRI Head   No evidence of intracranial mass, infarct, hemorrhage, or hydrocephalus.    LP 8/20/18  CSF- no color, 10 RBCs, Total Nucleated Cell Count 1  LDH 30  Alb 27.4  IgG 4.1  Glucose 70  Protein 45      REVIEW OF SYSTEMS  Constitutional - No fever, No weight loss, No fatigue  HEENT - No eye pain, No visual disturbances, No difficulty hearing, No tinnitus, No vertigo, No neck pain  Respiratory - No cough, No wheezing, No shortness of breath  Cardiovascular - No chest pain, No palpitations  Gastrointestinal - No abdominal pain, No nausea, No vomiting, No diarrhea, No constipation  Genitourinary - No dysuria, No frequency, No hematuria, No incontinence  Neurological - No headaches, No memory loss, No loss of strength, No numbness, No tremors  Skin - No itching, No rashes, No lesions   Endocrine - No temperature intolerance  Musculoskeletal - No joint pain, No joint swelling, No muscle pain  Psychiatric - No depression, No anxiety    VITALS  T(C): 36.7 (08-22-18 @ 07:45), Max: 36.9 (08-21-18 @ 23:58)  HR: 65 (08-22-18 @ 07:45) (63 - 89)  BP: 125/82 (08-22-18 @ 07:45) (93/60 - 132/88)  RR: 18 (08-22-18 @ 07:45) (16 - 18)  SpO2: 100% (08-22-18 @ 07:45) (94% - 100%)  Wt(kg): --    PAST MEDICAL & SURGICAL HISTORY  No pertinent past medical history  S/P shoulder surgery  Left rotator cuff tear  Laceration of scalp  Laceration of scapula      SOCIAL HISTORY  Smoking - Denied  EtOH - Denied   Drugs - Denied    FUNCTIONAL HISTORY  Lives   Independent    CURRENT FUNCTIONAL STATUS      FAMILY HISTORY   No pertinent family history in first degree relatives  No pertinent family history in first degree relatives      RECENT LABS/IMAGING  CBC Full  -  ( 21 Aug 2018 07:22 )  WBC Count : 5.2 K/uL  Hemoglobin : 15.8 g/dL  Hematocrit : 47.6 %  Platelet Count - Automated : 171 K/uL  Mean Cell Volume : 103.3 fl  Mean Cell Hemoglobin : 34.3 pg  Mean Cell Hemoglobin Concentration : 33.2 g/dL  Auto Neutrophil # : x  Auto Lymphocyte # : x  Auto Monocyte # : x  Auto Eosinophil # : x  Auto Basophil # : x  Auto Neutrophil % : x  Auto Lymphocyte % : x  Auto Monocyte % : x  Auto Eosinophil % : x  Auto Basophil % : x        TPro  x   /  Alb  4328  /  TBili  x   /  DBili  x   /  AST  x   /  ALT  x   /  AlkPhos  x   08-20        ALLERGIES  No Known Drug Allergies  pork (Rash)  pork (Short breath)      MEDICATIONS   cyanocobalamin Injectable 1000 MICROGram(s) IntraMuscular daily  enoxaparin Injectable 40 milliGRAM(s) SubCutaneous every 24 hours Patient is a 38y old  Male who presents with a chief complaint of Bilateral leg weakness (19 Aug 2018 17:42)      HPI: 37 y/o male, with no significant PMHx, presents to ED on 08/19/18 c/o a 1 week history of worsening B/L lower extremity numbness and weakness. Weakness began at his toes and ascended to his thighs. Pt. Denies back pain, fever, recent travel, or tick bites. Denies associated urinary frequency/urgency/dysuria or incontinence, diarrhea, constipation or fecal incontinence. He states that he went to a doctor recently who ordered blood work and urine and gave him medication which he has been taking regularly, but he cannot recall the medication.  In the ED, CT head, CT thoracic/lumbar/cervical spine were negative. CT chest abdomen and pelvis were negative. LP was done, CSF gram stain negative; negative for meningitis/encephalitis. Labs showed Macrocytic anemia, with normal B12,Homocysteine, and Methylmalonic acid levels. Patient has received B12 injections for 3 days and plans are being explored for EMG.        ------------------------------------  Imaging showed:  HEAD CT - No acute intracranial hemorrhage or acute territorial infarct.    CT Lumber chest/Abd/Pelvs:   No evidence of acute thoracic or abdominal visceral injury.  No acute bony fractures.    08/19/18  MRI Lumbar:  No evidence for lower thoracic spinal cord compression or cauda equina   compression. Nonspecific right paraspinal soft tissue edema is noted with   differential diagnosis as described.    MRI Cervical:  No acute abnormality is noted in the cervical spine.    MRI Thoracic:  No acute abnormality is noted in the thoracic spine.    MRI Head   No evidence of intracranial mass, infarct, hemorrhage, or hydrocephalus.    LP 8/20/18  CSF- no color, 10 RBCs, Total Nucleated Cell Count 1  LDH 30  Alb 27.4  IgG 4.1  Glucose 70  Protein 45      REVIEW OF SYSTEMS  Constitutional - No fever, No weight loss, No fatigue  HEENT - No eye pain, No visual disturbances, No difficulty hearing, No tinnitus, No vertigo,   Respiratory - No cough, No wheezing, No shortness of breath  Cardiovascular - No chest pain, No palpitations  Gastrointestinal - No abdominal pain, No nausea, No vomiting, No diarrhea, No constipation  Genitourinary - No dysuria, No frequency, No hematuria, No incontinence  Neurological - No headaches, No memory loss, No loss of strength, + numbness at toes, No tremors  Skin - No itching, No rashes, No lesions   Endocrine - No temperature intolerance  Musculoskeletal - +slight shoulder and neck pain on motor exam, No joint swelling, No muscle pain  Psychiatric - No depression, No anxiety    VITALS  T(C): 36.7 (08-22-18 @ 07:45), Max: 36.9 (08-21-18 @ 23:58)  HR: 65 (08-22-18 @ 07:45) (63 - 89)  BP: 125/82 (08-22-18 @ 07:45) (93/60 - 132/88)  RR: 18 (08-22-18 @ 07:45) (16 - 18)  SpO2: 100% (08-22-18 @ 07:45) (94% - 100%)  Wt(kg): --    PAST MEDICAL & SURGICAL HISTORY  No pertinent past medical history  S/P shoulder surgery  Left rotator cuff tear  Laceration of scalp  Laceration of scapula      SOCIAL HISTORY  Smoking - Denied  EtOH - Occasional   Drugs - Denied    FUNCTIONAL HISTORY  Lives with his cousin, no steps to navigate  Independent    CURRENT FUNCTIONAL STATUS  Ambulating independently    FAMILY HISTORY   No pertinent family history in first degree relatives  No pertinent family history in first degree relatives      RECENT LABS/IMAGING  CBC Full  -  ( 21 Aug 2018 07:22 )  WBC Count : 5.2 K/uL  Hemoglobin : 15.8 g/dL  Hematocrit : 47.6 %  Platelet Count - Automated : 171 K/uL  Mean Cell Volume : 103.3 fl  Mean Cell Hemoglobin : 34.3 pg  Mean Cell Hemoglobin Concentration : 33.2 g/dL  Auto Neutrophil # : x  Auto Lymphocyte # : x  Auto Monocyte # : x  Auto Eosinophil # : x  Auto Basophil # : x  Auto Neutrophil % : x  Auto Lymphocyte % : x  Auto Monocyte % : x  Auto Eosinophil % : x  Auto Basophil % : x        TPro  x   /  Alb  4328  /  TBili  x   /  DBili  x   /  AST  x   /  ALT  x   /  AlkPhos  x   08-20        ALLERGIES  No Known Drug Allergies  pork (Rash)  pork (Short breath)      MEDICATIONS   cyanocobalamin Injectable 1000 MICROGram(s) IntraMuscular daily  enoxaparin Injectable 40 milliGRAM(s) SubCutaneous every 24 hours      ----------------------------------------------------------------------------------------  PHYSICAL EXAM  Constitutional - NAD, Comfortable  HEENT - NCAT, EOMI  Neck - Supple, No limited ROM  Chest - Breathing comfortably, No wheezing  Cardiovascular - S1S2   Abdomen - Soft   Extremities - No C/C/E, No calf tenderness   Neurologic Exam -                    Cognitive - Awake, Alert, AAO to self, place, date, year, situation     Communication - Fluent, No dysarthria     Cranial Nerves - CN 2-12 intact     Motor -                     LEFT    UE - ShAB 4/5, EF 5/5, EE 5/5,  4/5                    RIGHT UE - ShAB 5/5, EF 5/5, EE 5/5,  5/5                    LEFT    LE - HF 5/5, KE 5/5, DF 5/5, PF 5/5                    RIGHT LE - HF 5/5, KE 5/5, DF 5/5, PF 5/5        Sensory - slight numbness b/l toes     Proprioception deficit- b/l big toes     Reflexes - areflexia patellar, achilles b/l, 1+ reflex biceps, brachioradialis b/l     OculoVestibular - No saccades, No nystagmus,         Balance - WNL Static     Gait- normal gait, able to walk on heels and toes  Psychiatric - Mood stable, Affect WNL      ------------------------------------------------------------------------------------------------  ASSESSMENT/PLAN  38yMale with no significant PMH with improvement but some residual b/l numbness in toes after initially presenting with b/l LE numbness and weakness up to calves with no diagnostic findings on imaging, labs, and LP.  Pain - Tylenol PRN  DVT PPX - lovenox  Rehab -    Patients symptoms improving, does not need rehab.    Please follow up Dr. Amaury patiño cc: Patient is a 38y old  Male who presents with a chief complaint of Bilateral leg weakness -   Patient seen with       HPI: 39 y/o male, with no significant PMHx, presents to ED on 08/19/18 c/o a 1 week history of worsening B/L lower extremity numbness and weakness. Weakness began at his toes and ascended to his thighs. Pt. Denies back pain, fever, recent travel, or tick bites. Denies associated urinary frequency/urgency/dysuria or incontinence, diarrhea, constipation or fecal incontinence. He states that he went to a doctor recently who ordered blood work and urine and gave him medication which he has been taking regularly, but he cannot recall the medication.  In the ED, CT head, CT thoracic/lumbar/cervical spine were negative. CT chest abdomen and pelvis were negative. LP was done, CSF gram stain negative; negative for meningitis/encephalitis. Labs showed Macrocytic anemia, with normal B12,Homocysteine, and Methylmalonic acid levels. Patient has received B12 injections for 3 days and plans are being explored for EMG. Rehab evaluation requested for additional recommendations        ------------------------------------  Imaging showed:  HEAD CT - No acute intracranial hemorrhage or acute territorial infarct.    CT Lumber chest/Abd/Pelvs:   No evidence of acute thoracic or abdominal visceral injury.  No acute bony fractures.    08/19/18  MRI Lumbar:  No evidence for lower thoracic spinal cord compression or cauda equina   compression. Nonspecific right paraspinal soft tissue edema is noted with   differential diagnosis as described.    MRI Cervical:  No acute abnormality is noted in the cervical spine.    MRI Thoracic:  No acute abnormality is noted in the thoracic spine.    MRI Head   No evidence of intracranial mass, infarct, hemorrhage, or hydrocephalus.    LP 8/20/18  CSF- no color, 10 RBCs, Total Nucleated Cell Count 1  LDH 30  Alb 27.4  IgG 4.1  Glucose 70  Protein 45      REVIEW OF SYSTEMS  Constitutional - No fever,   HEENT - No eye pain,   Respiratory - No cough, No wheezing, No shortness of breath  Cardiovascular - No chest pain, No palpitations  Gastrointestinal - No abdominal pain,   Genitourinary - No dysuria,   Neurological - No headaches, + numbness at toes,   Musculoskeletal - +slight shoulder and neck pain on motor exam, No joint swelling, No muscle pain  Psychiatric - No depression, No anxiety      PAST MEDICAL & SURGICAL HISTORY  No pertinent past medical history  S/P shoulder surgery  Left rotator cuff tear  Laceration of scalp  Laceration of scapula      SOCIAL HISTORY  Smoking - Denied  EtOH - Occasional   Drugs - Denied    FUNCTIONAL HISTORY  Lives with his cousin, no steps to navigate  Independent    CURRENT FUNCTIONAL STATUS  Ambulating independently    FAMILY HISTORY   No pertinent family history in first degree relatives  No pertinent family history in first degree relatives      RECENT LABS/IMAGING  CBC Full  -  ( 21 Aug 2018 07:22 )  WBC Count : 5.2 K/uL  Hemoglobin : 15.8 g/dL  Hematocrit : 47.6 %  Platelet Count - Automated : 171 K/uL  Mean Cell Volume : 103.3 fl  Mean Cell Hemoglobin : 34.3 pg  Mean Cell Hemoglobin Concentration : 33.2 g/dL  Auto Neutrophil # : x  Auto Lymphocyte # : x  Auto Monocyte # : x  Auto Eosinophil # : x  Auto Basophil # : x  Auto Neutrophil % : x  Auto Lymphocyte % : x  Auto Monocyte % : x  Auto Eosinophil % : x  Auto Basophil % : x        TPro  x   /  Alb  4328  /  TBili  x   /  DBili  x   /  AST  x   /  ALT  x   /  AlkPhos  x   08-20        ALLERGIES  No Known Drug Allergies  pork (Rash)  pork (Short breath)      MEDICATIONS   cyanocobalamin Injectable 1000 MICROGram(s) IntraMuscular daily  enoxaparin Injectable 40 milliGRAM(s) SubCutaneous every 24 hours      ----------------------------------------------------------------------------------------  VITALS  T(C): 36.7 (08-22-18 @ 07:45), Max: 36.9 (08-21-18 @ 23:58)  HR: 65 (08-22-18 @ 07:45) (63 - 89)  BP: 125/82 (08-22-18 @ 07:45) (93/60 - 132/88)  RR: 18 (08-22-18 @ 07:45) (16 - 18)  SpO2: 100% (08-22-18 @ 07:45) (94% - 100%)  Wt(kg): --      PHYSICAL EXAM  Constitutional - NAD, Comfortable  HEENT - NCAT, EOMI  Neck - Supple, No limited ROM  Chest - Breathing comfortably, No wheezing  Cardiovascular - S1S2   Abdomen - Soft   Extremities - No C/C/E, No calf tenderness   Neurologic Exam -                    Cognitive - Awake, Alert, AAO to self, place, date, year, situation     Communication - Fluent, No dysarthria     Cranial Nerves - CN 2-12 intact     Motor -                     LEFT    UE - ShAB 4/5, EF 5/5, EE 5/5,  4/5                    RIGHT UE - ShAB 5/5, EF 5/5, EE 5/5,  5/5                    LEFT    LE - HF 5/5, KE 5/5, DF 5/5, PF 5/5                    RIGHT LE - HF 5/5, KE 5/5, DF 5/5, PF 5/5        Sensory - slight numbness b/l toes     Proprioception deficit- b/l big toes     Reflexes - areflexia patellar, achilles b/l, 1+ reflex biceps, brachioradialis b/l     OculoVestibular - No saccades, No nystagmus,         Balance - WNL Static     Gait- normal gait, able to walk on heels and toes  Psychiatric - Mood stable, Affect WNL      ------------------------------------------------------------------------------------------------  ASSESSMENT/PLAN  38yMale with no significant PMH with improvement but some residual b/l numbness in toes after initially presenting with b/l LE numbness and weakness up to calves with no diagnostic findings on imaging, labs, and LP.  Pain - Tylenol PRN  DVT PPX - lovenox  Rehab -    Patients symptoms improving, does not need rehab.    Please follow up Dr. Amaury patiño

## 2018-08-22 NOTE — PROGRESS NOTE ADULT - ATTENDING COMMENTS
Patient seen and examined, agree with the above assessment and plan. EMG with [facility] vs transfer to [place]? Will coordinate

## 2018-08-22 NOTE — PROGRESS NOTE ADULT - SUBJECTIVE AND OBJECTIVE BOX
CC: Tim Leg pains and difficulty ambulating.  Myopathy.   HPI:  The patient is a 38 year old with no known past medical history who presented to the ER with complaints of lower extremity numbness and weakness for 1 week. Progressive waekness that started in his toes and progressed to his thichs. denies assocaited back pain. He denies associated fever, recent travel or tick bites. Denies associated urinary frequency/urgency/dysuria or incontinence, denies diarrhea, constipation or fecal incontinence. He has never had anything like this before.He states that he drinks one beer every once in a while and last drank only one beer 2 days ago and prior to that he had one beer last week. He denies smoking or illicit drugs. He states that he went to a doctor recently who ordered blood work and urine and gave him medication which he has been taking regularly, but he cannot recall the medication.  In the ED, CT head, CT thoracic/lumbar/cervical spine were negative. CT chest abdomen and pelvis were negative. LP was done, CSF gram stain negative; negative for meningitis/encephalitis. (19 Aug 2018 17:42)    REVIEW OF SYSTEMS:    Patient denied fever, chills, abdominal pain, nausea, vomiting, cough, shortness of breath, chest pain or palpitations    Vital Signs Last 24 Hrs  T(C): 36.7 (21 Aug 2018 08:24), Max: 37.1 (20 Aug 2018 23:51)  T(F): 98 (21 Aug 2018 08:24), Max: 98.7 (20 Aug 2018 23:51)  HR: 59 (21 Aug 2018 08:24) (56 - 69)  BP: 125/80 (21 Aug 2018 08:24) (113/75 - 125/80)  BP(mean): --  RR: 16 (21 Aug 2018 08:24) (16 - 19)  SpO2: 99% (21 Aug 2018 08:24) (96% - 100%)I&O's Summary    PHYSICAL EXAM:  GENERAL: NAD,  HEENT: PERRL, +EOMI, anicteric, no [place]  NECK: Supple, No JVD   CHEST/LUNG: CTA bilaterally; Normal effort  HEART: S1S2 Normal intensity, no murmurs, gallops or rubs noted  ABDOMEN: Soft, BS Normoactive, NT, ND, no HSM noted  EXTREMITIES:  2+ radial and DP pulses noted, no clubbing, cyanosis, or edema noted, Limited mobility   SKIN: No rashes or lesions noted  NEURO: A&Ox3, no focal deficits noted, CN II-XII intact  PSYCH: normal mood and affect; insight/judgement appropriate  LABS:                        15.8   5.2   )-----------( 171      ( 21 Aug 2018 07:22 )             47.6     08-20    136  |  96<L>  |  8.0  ----------------------------<  88  4.2   |  26.0  |  0.55    Ca    9.4      20 Aug 2018 06:45        Urinalysis Basic - ( 19 Aug 2018 19:27 )    Color: Yellow / Appearance: Clear / S.010 / pH: x  Gluc: x / Ketone: Small  / Bili: Negative / Urobili: Negative mg/dL   Blood: x / Protein: 15 mg/dL / Nitrite: Negative   Leuk Esterase: Negative / RBC: 3-5 /HPF / WBC 0-2   Sq Epi: x / Non Sq Epi: Occasional / Bacteria: Occasional      RADIOLOGY & ADDITIONAL TESTS:    MEDICATIONS:  MEDICATIONS  (STANDING):  cyanocobalamin Injectable 1000 MICROGram(s) IntraMuscular daily  enoxaparin Injectable 40 milliGRAM(s) SubCutaneous every 24 hours    MEDICATIONS  (PRN):
CC: Bilateral leg weakness     INTERVAL HPI/OVERNIGHT EVENTS: Patient seen and examined. No acute issues overnight. States numbness of lower extremities has improved, now only has numbness of toes on both feet. Able to ambulate without difficulty. Denies chest pain, SOB, dizziness, lightheadedness, nausea, vomiting, fever, chills.    Vital Signs Last 24 Hrs  T(C): 36.7 (22 Aug 2018 07:45), Max: 36.9 (21 Aug 2018 23:58)  T(F): 98.1 (22 Aug 2018 07:45), Max: 98.5 (21 Aug 2018 23:58)  HR: 65 (22 Aug 2018 07:45) (63 - 89)  BP: 125/82 (22 Aug 2018 07:45) (93/60 - 132/88)  BP(mean): --  RR: 18 (22 Aug 2018 07:45) (16 - 18)  SpO2: 100% (22 Aug 2018 07:45) (94% - 100%)    PHYSICAL EXAM:  GENERAL: NAD,  HEENT: PERRL, +EOMI, anicteric, no [place]  NECK: Supple, No JVD   CHEST/LUNG: CTA bilaterally; Normal effort  HEART: S1S2 Normal intensity, no murmurs, gallops or rubs noted  ABDOMEN: Soft, BS Normoactive, NT, ND, no HSM noted  EXTREMITIES:  2+ radial and DP pulses noted, no clubbing, cyanosis, or edema noted   SKIN: No rashes or lesions noted  NEURO: Alert and oriented x 3. TEJADA x 4, +B/L numbness toes    I&O's Detail                                15.8   5.2   )-----------( 171      ( 21 Aug 2018 07:22 )             47.6         TPro  x      /  Alb  4328   /  TBili  x      /  DBili  x      /  AST  x      /  ALT  x      /  AlkPhos  x      20 Aug 2018 20:45      CAPILLARY BLOOD GLUCOSE        LIVER FUNCTIONS - ( 20 Aug 2018 20:45 )  Alb: 4328 mg/dL / Pro: x     / ALK PHOS: x     / ALT: x     / AST: x     / GGT: x             Hemoglobin A1C, Whole Blood: 5.2 % (08-20-18 @ 06:45)    MEDICATIONS  (STANDING):  cyanocobalamin Injectable 1000 MICROGram(s) IntraMuscular daily  enoxaparin Injectable 40 milliGRAM(s) SubCutaneous every 24 hours    MEDICATIONS  (PRN):      RADIOLOGY & ADDITIONAL TESTS:
CC: Lower extremity weakness    INTERVAL HPI/OVERNIGHT EVENTS: Patient seen and examined with [place]  services. Complaints of bilateral leg weakness and numbness. Denies chest pain, sob or palpitations Denies headache or back pain.       Vital Signs Last 24 Hrs  T(C): 36.3 (20 Aug 2018 11:32), Max: 37.1 (19 Aug 2018 14:06)  T(F): 97.4 (20 Aug 2018 11:32), Max: 98.7 (19 Aug 2018 14:06)  HR: 63 (20 Aug 2018 11:32) (55 - 69)  BP: 126/78 (20 Aug 2018 11:32) (118/77 - 152/96)  BP(mean): --  RR: 18 (20 Aug 2018 11:32) (16 - 18)  SpO2: 98% (20 Aug 2018 11:32) (98% - 100%)    PHYSICAL EXAM:    GENERAL: NAD, AOX3  HEAD:  Atraumatic, Normocephalic  EYES: EOMI, PERRLA, conjunctiva and sclera clear  ENMT: Moist mucous membranes  NECK: Supple, No JVD  NERVOUS SYSTEM:  Alert & Oriented X3, Motor Strength 5/5 B/L upper and lower extremities; reflexes absent at the knee and ankle decreased sensation lower extremities   CHEST/LUNG: Clear to auscultation bilaterally; No rales, rhonchi, wheezing, or rubs  HEART: Regular rate and rhythm; No murmurs, rubs, or gallops  ABDOMEN: Soft, Nontender, Nondistended; Bowel sounds present  EXTREMITIES:  2+ Peripheral Pulses, No clubbing, cyanosis, or edema        MEDICATIONS  (STANDING):  cyanocobalamin Injectable 1000 MICROGram(s) IntraMuscular daily  enoxaparin Injectable 40 milliGRAM(s) SubCutaneous every 24 hours    MEDICATIONS  (PRN):      Allergies    No Known Drug Allergies  pork (Rash)  pork (Short breath)    Intolerances          LABS:                          15.9   3.7   )-----------( 141      ( 20 Aug 2018 06:45 )             44.9     08-20    136  |  96<L>  |  8.0  ----------------------------<  88  4.2   |  26.0  |  0.55    Ca    9.4      20 Aug 2018 06:45    TPro  7.4  /  Alb  4.6  /  TBili  0.5  /  DBili  x   /  AST  38  /  ALT  33  /  AlkPhos  50  08-19    PT/INR - ( 19 Aug 2018 12:23 )   PT: 10.0 sec;   INR: 0.91 ratio         PTT - ( 19 Aug 2018 12:23 )  PTT:34.5 sec  Urinalysis Basic - ( 19 Aug 2018 19:27 )    Color: Yellow / Appearance: Clear / S.010 / pH: x  Gluc: x / Ketone: Small  / Bili: Negative / Urobili: Negative mg/dL   Blood: x / Protein: 15 mg/dL / Nitrite: Negative   Leuk Esterase: Negative / RBC: 3-5 /HPF / WBC 0-2   Sq Epi: x / Non Sq Epi: Occasional / Bacteria: Occasional        RADIOLOGY & ADDITIONAL TESTS:
INTERVAL HISTORY:  stable numbness persists.  He feels it may be from excessive bicycle riding that he deos every day to work.      VITAL SIGNS:  Vital Signs Last 24 Hrs  T(C): 36.7 (21 Aug 2018 08:24), Max: 37.1 (20 Aug 2018 23:51)  T(F): 98 (21 Aug 2018 08:24), Max: 98.7 (20 Aug 2018 23:51)  HR: 59 (21 Aug 2018 08:24) (56 - 69)  BP: 125/80 (21 Aug 2018 08:24) (113/75 - 126/78)  BP(mean): --  RR: 16 (21 Aug 2018 08:24) (16 - 19)  SpO2: 99% (21 Aug 2018 08:24) (96% - 100%)    PHYSICAL EXAMINATION:    Mentation:  nl  Language/Speech: nl  CN: nl  Visual Fields: nl  Motor: nl  Sensory: diminshed prop  DTR: reduced  Babinski:      MEDS:  MEDICATIONS  (STANDING):  cyanocobalamin Injectable 1000 MICROGram(s) IntraMuscular daily  enoxaparin Injectable 40 milliGRAM(s) SubCutaneous every 24 hours    MEDICATIONS  (PRN):      LABS:                          15.8   5.2   )-----------( 171      ( 21 Aug 2018 07:22 )             47.6     08-20    136  |  96<L>  |  8.0  ----------------------------<  88  4.2   |  26.0  |  0.55    Ca    9.4      20 Aug 2018 06:45    TPro  7.4  /  Alb  4.6  /  TBili  0.5  /  DBili  x   /  AST  38  /  ALT  33  /  AlkPhos  50  08-19    LIVER FUNCTIONS - ( 19 Aug 2018 12:23 )  Alb: 4.6 g/dL / Pro: 7.4 g/dL / ALK PHOS: 50 U/L / ALT: 33 U/L / AST: 38 U/L / GGT: x                RADIOLOGY & ADDITIONAL STUDIES:      IMPRESSION & PLAN:    Exam is consistent with a neuropathic process. Evaluation inhouse has been negative.  He needs EMG study and perhaps SSEP.  Question of IVIG for inflammatory neuropathy (AIDP) is considered.  Results of EMG might determined 1. treatment and 2. need to stay inhouse for this treatment.  He has no insurance which complicates outpatient management.  Discussed with SSW and floor staff    No one is available inhouse at [facility] to do EMG.  Can this be done by Dr. Car's group or at [facility]?

## 2018-08-23 ENCOUNTER — TRANSCRIPTION ENCOUNTER (OUTPATIENT)
Age: 38
End: 2018-08-23

## 2018-08-23 VITALS
SYSTOLIC BLOOD PRESSURE: 108 MMHG | HEART RATE: 65 BPM | RESPIRATION RATE: 18 BRPM | TEMPERATURE: 98 F | DIASTOLIC BLOOD PRESSURE: 76 MMHG | OXYGEN SATURATION: 100 %

## 2018-08-23 LAB
CULTURE RESULTS: SIGNIFICANT CHANGE UP
INTERPRETATION SERPL IFE-IMP: SIGNIFICANT CHANGE UP
METHYLMALONATE SERPL-SCNC: 192 NMOL/L — SIGNIFICANT CHANGE UP (ref 0–378)
OLIGOCLONAL BANDS CSF ELPH-IMP: SIGNIFICANT CHANGE UP
SPECIMEN SOURCE: SIGNIFICANT CHANGE UP
VDRL CSF-TITR: NEGATIVE — SIGNIFICANT CHANGE UP

## 2018-08-23 PROCEDURE — 99239 HOSP IP/OBS DSCHRG MGMT >30: CPT

## 2018-08-23 PROCEDURE — 99222 1ST HOSP IP/OBS MODERATE 55: CPT | Mod: GC

## 2018-08-23 RX ORDER — PREGABALIN 225 MG/1
1 CAPSULE ORAL
Qty: 30 | Refills: 0 | OUTPATIENT
Start: 2018-08-23 | End: 2018-09-21

## 2018-08-23 NOTE — DISCHARGE NOTE ADULT - HOSPITAL COURSE
38 year old with no known past medical history who presented to the ER with complaints of lower extremity numbness and weakness for 1 week. Progressive weakness that started in his toes and progressed to his thighs denies associated back pain. He denies associated fever, recent travel or tick bites. Denies associated urinary frequency/urgency/dysuria or incontinence, denies diarrhea, constipation or fecal incontinence. In the ED, CT head, CT thoracic/lumbar/cervical spine were negative. CT chest abdomen and pelvis were negative. LP was done, CSF gram stain negative; negative for meningitis/encephalitis. Seen by Neurology and Physical Medicine and Rehabilitation attending. Patient states that his symptoms have significantly improved and now localized only to the toes. Denies pain. He also reports that he recently increased his bicycle activity about 3-4 weeks ago.  Exam shows diminished light touch at toes and impaired proprioception. Motor strength normal. Reflexes diminished at patellar and achilles. Patient able to stand on his toes and heels. Gait is normal. His symptoms do not affect his function. An EMG is needed for eventual diagnosis. Patient agreeing for follow up as per discharge instructions to [facility] and referral to Neurology for EMG as outpatient. The patient is stable for discharge.   Vital Signs Last 24 Hrs  T(C): 36.5 (23 Aug 2018 08:57), Max: 36.9 (22 Aug 2018 15:26)  T(F): 97.7 (23 Aug 2018 08:57), Max: 98.4 (22 Aug 2018 15:26)  HR: 65 (23 Aug 2018 08:57) (56 - 70)  BP: 108/76 (23 Aug 2018 08:57) (105/75 - 111/76)  BP(mean): --  RR: 18 (23 Aug 2018 08:57) (16 - 18)  SpO2: 100% (23 Aug 2018 08:57) (100% - 100%)                CAPILLARY BLOOD GLUCOSE            Hemoglobin A1C, Whole Blood: 5.2 % (08-20 @ 06:45)    PHYSICAL EXAM:  GENERAL: NAD,  HEENT: PERRL, +EOMI, anicteric, no [place]  NECK: Supple, No JVD   CHEST/LUNG: CTA bilaterally; Normal effort  HEART: S1S2 Normal intensity, no murmurs, gallops or rubs noted  ABDOMEN: Soft, BS Normoactive, NT, ND, no HSM noted  EXTREMITIES:  2+ radial and DP pulses noted, no clubbing, cyanosis, or edema noted   SKIN: No rashes or lesions noted  NEURO: Alert and oriented x 3. TEJADA x 4, +B/L numbness toes 38 year old with no known past medical history who presented to the ER with complaints of lower extremity numbness and weakness for 1 week. Progressive weakness that started in his toes and progressed to his thighs denies associated back pain. He denies associated fever, recent travel or tick bites. Denies associated urinary frequency/urgency/dysuria or incontinence, denies diarrhea, constipation or fecal incontinence. In the ED, CT head, CT thoracic/lumbar/cervical spine were negative. CT chest abdomen and pelvis were negative. LP was done, CSF gram stain negative; negative for meningitis/encephalitis. Seen by Neurology and Physical Medicine and Rehabilitation attending. Patient states that his symptoms have significantly improved and now localized only to the toes. Denies pain. He also reports that he recently increased his bicycle activity about 3-4 weeks ago.  Exam shows diminished light touch at toes and impaired proprioception. Motor strength normal. Reflexes diminished at patellar and achilles. Patient able to stand on his toes and heels. Gait is normal. His symptoms do not affect his function. An EMG is needed for eventual diagnosis. Patient agreeing for follow up as per discharge instructions to [facility] and referral to Neurology for EMG as outpatient. The patient is stable for discharge. 40 mins spent   Vital Signs Last 24 Hrs  T(C): 36.5 (23 Aug 2018 08:57), Max: 36.9 (22 Aug 2018 15:26)  T(F): 97.7 (23 Aug 2018 08:57), Max: 98.4 (22 Aug 2018 15:26)  HR: 65 (23 Aug 2018 08:57) (56 - 70)  BP: 108/76 (23 Aug 2018 08:57) (105/75 - 111/76)  BP(mean): --  RR: 18 (23 Aug 2018 08:57) (16 - 18)  SpO2: 100% (23 Aug 2018 08:57) (100% - 100%)                CAPILLARY BLOOD GLUCOSE            Hemoglobin A1C, Whole Blood: 5.2 % (08-20 @ 06:45)    PHYSICAL EXAM:  GENERAL: NAD,  HEENT: PERRL, +EOMI, anicteric, no [place]  NECK: Supple, No JVD   CHEST/LUNG: CTA bilaterally; Normal effort  HEART: S1S2 Normal intensity, no murmurs, gallops or rubs noted  ABDOMEN: Soft, BS Normoactive, NT, ND, no HSM noted  EXTREMITIES:  2+ radial and DP pulses noted, no clubbing, cyanosis, or edema noted   SKIN: No rashes or lesions noted  NEURO: Alert and oriented x 3. TEJADA x 4, +B/L numbness toes

## 2018-08-23 NOTE — DISCHARGE NOTE ADULT - MEDICATION SUMMARY - MEDICATIONS TO TAKE
I will START or STAY ON the medications listed below when I get home from the hospital:    famotidine 20 mg oral tablet  --  by mouth   -- Indication: For GI prophylaxis    Vitamin B12 100 mcg oral tablet  -- 1 tab(s) by mouth once a day   -- Indication: For B 12 deficiency

## 2018-08-23 NOTE — DISCHARGE NOTE ADULT - MEDICATION SUMMARY - MEDICATIONS TO STOP TAKING
I will STOP taking the medications listed below when I get home from the hospital:    ibuprofen 200 mg oral tablet  -- 1 tab(s) by mouth prn, As Needed  -- will stop 1 week pre op    Percocet 10/325 oral tablet  -- 1 tab(s) by mouth every 6 hours, As Needed

## 2018-08-23 NOTE — DISCHARGE NOTE ADULT - PROVIDER TOKENS
FREE:[LAST:[[facility]],PHONE:[phone]],FAX:[(   )    -],ADDRESS:[[address] [place] [address]]]

## 2018-08-23 NOTE — DISCHARGE NOTE ADULT - CARE PROVIDER_API CALL
[facility],   [address] [place] [address]  Phone: [phone]  Fax: (       -

## 2018-08-23 NOTE — DISCHARGE NOTE ADULT - COMMUNITY RESOURCES
J[facility] 8/27/18- AT 10:45 WITH DR PERSAUD  [address] [facility]   [phone]

## 2018-08-23 NOTE — DISCHARGE NOTE ADULT - CARE PLAN
Principal Discharge DX:	Weakness of both lower extremities  Goal:	Resolution of symptoms  Assessment and plan of treatment:	Follow up with [facility] appointment as scheduled  Will need referral to Neurology as outpatient to [facility] for eventual EMG  Continue B 12 as ordered  Return to ER if symptoms worsen  Secondary Diagnosis:	Macrocytic anemia with vitamin B12 deficiency  Assessment and plan of treatment:	As above

## 2018-08-23 NOTE — DISCHARGE NOTE ADULT - PATIENT PORTAL LINK FT
You can access the [facility] Patient Portal, offered by [facility], by registering with the following website: [email or web address]

## 2018-08-23 NOTE — DISCHARGE NOTE ADULT - PLAN OF CARE
Resolution of symptoms Follow up with [facility] appointment as scheduled  Will need referral to Neurology as outpatient to [facility] for eventual EMG  Continue B 12 as ordered  Return to ER if symptoms worsen As above

## 2018-08-31 LAB
WNV RNA SPEC QL NAA+PROBE: NEGATIVE — SIGNIFICANT CHANGE UP
WNV RNA SPEC QL NAA+PROBE: SIGNIFICANT CHANGE UP

## 2018-12-22 ENCOUNTER — EMERGENCY (EMERGENCY)
Facility: HOSPITAL | Age: 38
LOS: 1 days | Discharge: HOME | End: 2018-12-22
Attending: EMERGENCY MEDICINE
Payer: MEDICAID

## 2018-12-22 VITALS
OXYGEN SATURATION: 98 % | RESPIRATION RATE: 18 BRPM | SYSTOLIC BLOOD PRESSURE: 151 MMHG | HEART RATE: 76 BPM | TEMPERATURE: 97 F | WEIGHT: 149.91 LBS | HEIGHT: 62 IN | DIASTOLIC BLOOD PRESSURE: 91 MMHG

## 2018-12-22 DIAGNOSIS — Z98.89 OTHER SPECIFIED POSTPROCEDURAL STATES: Chronic | ICD-10-CM

## 2018-12-22 DIAGNOSIS — S01.01XA LACERATION WITHOUT FOREIGN BODY OF SCALP, INITIAL ENCOUNTER: Chronic | ICD-10-CM

## 2018-12-22 DIAGNOSIS — M75.102 UNSPECIFIED ROTATOR CUFF TEAR OR RUPTURE OF LEFT SHOULDER, NOT SPECIFIED AS TRAUMATIC: Chronic | ICD-10-CM

## 2018-12-22 PROCEDURE — 99284 EMERGENCY DEPT VISIT MOD MDM: CPT

## 2018-12-22 PROCEDURE — T1013: CPT

## 2018-12-22 PROCEDURE — 99283 EMERGENCY DEPT VISIT LOW MDM: CPT

## 2018-12-22 RX ORDER — METHOCARBAMOL 500 MG/1
1500 TABLET, FILM COATED ORAL ONCE
Qty: 0 | Refills: 0 | Status: COMPLETED | OUTPATIENT
Start: 2018-12-22 | End: 2018-12-22

## 2018-12-22 RX ORDER — ACETAMINOPHEN 500 MG
3 TABLET ORAL
Qty: 30 | Refills: 0 | OUTPATIENT
Start: 2018-12-22 | End: 2018-12-26

## 2018-12-22 RX ORDER — ACETAMINOPHEN 500 MG
975 TABLET ORAL ONCE
Qty: 0 | Refills: 0 | Status: COMPLETED | OUTPATIENT
Start: 2018-12-22 | End: 2018-12-22

## 2018-12-22 RX ORDER — METHOCARBAMOL 500 MG/1
2 TABLET, FILM COATED ORAL
Qty: 12 | Refills: 0 | OUTPATIENT
Start: 2018-12-22 | End: 2018-12-23

## 2018-12-22 RX ADMIN — Medication 975 MILLIGRAM(S): at 11:18

## 2018-12-22 RX ADMIN — METHOCARBAMOL 1500 MILLIGRAM(S): 500 TABLET, FILM COATED ORAL at 11:18

## 2018-12-22 RX ADMIN — Medication 60 MILLIGRAM(S): at 11:18

## 2018-12-22 NOTE — ED ADULT TRIAGE NOTE - CHIEF COMPLAINT QUOTE
Patient arrived to ED today with c/o back pains for three days.  Patient states he has chronic back issues and he may have injured his back at work.

## 2018-12-22 NOTE — ED STATDOCS - ATTENDING CONTRIBUTION TO CARE
MD/PA PT WITH EXACERBATION OF CHRONIC BACK PAIN. NO ACUTE FINDINGS ON EVAL. HX PE DOCUMENTED  AGREE WITH HX PE TX DISPO
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
## 2018-12-23 ENCOUNTER — EMERGENCY (EMERGENCY)
Facility: HOSPITAL | Age: 38
LOS: 1 days | Discharge: HOME | End: 2018-12-23
Attending: EMERGENCY MEDICINE
Payer: MEDICAID

## 2018-12-23 VITALS
DIASTOLIC BLOOD PRESSURE: 77 MMHG | OXYGEN SATURATION: 100 % | TEMPERATURE: 99 F | SYSTOLIC BLOOD PRESSURE: 109 MMHG | HEART RATE: 68 BPM | RESPIRATION RATE: 20 BRPM

## 2018-12-23 VITALS — HEIGHT: 66 IN | WEIGHT: 154.98 LBS

## 2018-12-23 DIAGNOSIS — S01.01XA LACERATION WITHOUT FOREIGN BODY OF SCALP, INITIAL ENCOUNTER: Chronic | ICD-10-CM

## 2018-12-23 DIAGNOSIS — R68.13 APPARENT LIFE THREATENING EVENT IN INFANT (ALTE): ICD-10-CM

## 2018-12-23 DIAGNOSIS — R69 ILLNESS, UNSPECIFIED: ICD-10-CM

## 2018-12-23 DIAGNOSIS — Z98.89 OTHER SPECIFIED POSTPROCEDURAL STATES: Chronic | ICD-10-CM

## 2018-12-23 DIAGNOSIS — M75.102 UNSPECIFIED ROTATOR CUFF TEAR OR RUPTURE OF LEFT SHOULDER, NOT SPECIFIED AS TRAUMATIC: Chronic | ICD-10-CM

## 2018-12-23 LAB
ALBUMIN SERPL ELPH-MCNC: 5.1 G/DL — SIGNIFICANT CHANGE UP (ref 3.3–5.2)
ALP SERPL-CCNC: 48 U/L — SIGNIFICANT CHANGE UP (ref 40–120)
ALT FLD-CCNC: 63 U/L — HIGH
ANION GAP SERPL CALC-SCNC: 15 MMOL/L — SIGNIFICANT CHANGE UP (ref 5–17)
APAP SERPL-MCNC: <7.5 UG/ML — LOW (ref 10–26)
APPEARANCE UR: CLEAR — SIGNIFICANT CHANGE UP
AST SERPL-CCNC: 45 U/L — HIGH
BASOPHILS # BLD AUTO: 0 K/UL — SIGNIFICANT CHANGE UP (ref 0–0.2)
BASOPHILS NFR BLD AUTO: 0.2 % — SIGNIFICANT CHANGE UP (ref 0–2)
BILIRUB SERPL-MCNC: 0.6 MG/DL — SIGNIFICANT CHANGE UP (ref 0.4–2)
BILIRUB UR-MCNC: NEGATIVE — SIGNIFICANT CHANGE UP
BUN SERPL-MCNC: 9 MG/DL — SIGNIFICANT CHANGE UP (ref 8–20)
CALCIUM SERPL-MCNC: 9.6 MG/DL — SIGNIFICANT CHANGE UP (ref 8.6–10.2)
CHLORIDE SERPL-SCNC: 102 MMOL/L — SIGNIFICANT CHANGE UP (ref 98–107)
CO2 SERPL-SCNC: 22 MMOL/L — SIGNIFICANT CHANGE UP (ref 22–29)
COLOR SPEC: YELLOW — SIGNIFICANT CHANGE UP
CREAT SERPL-MCNC: 0.67 MG/DL — SIGNIFICANT CHANGE UP (ref 0.5–1.3)
DIFF PNL FLD: NEGATIVE — SIGNIFICANT CHANGE UP
EOSINOPHIL # BLD AUTO: 0 K/UL — SIGNIFICANT CHANGE UP (ref 0–0.5)
EOSINOPHIL NFR BLD AUTO: 0.2 % — SIGNIFICANT CHANGE UP (ref 0–5)
ETHANOL SERPL-MCNC: <10 MG/DL — SIGNIFICANT CHANGE UP
GLUCOSE SERPL-MCNC: 125 MG/DL — HIGH (ref 70–115)
GLUCOSE UR QL: NEGATIVE MG/DL — SIGNIFICANT CHANGE UP
HCT VFR BLD CALC: 42.8 % — SIGNIFICANT CHANGE UP (ref 42–52)
HGB BLD-MCNC: 15.1 G/DL — SIGNIFICANT CHANGE UP (ref 14–18)
KETONES UR-MCNC: NEGATIVE — SIGNIFICANT CHANGE UP
LEUKOCYTE ESTERASE UR-ACNC: NEGATIVE — SIGNIFICANT CHANGE UP
LYMPHOCYTES # BLD AUTO: 0.7 K/UL — LOW (ref 1–4.8)
LYMPHOCYTES # BLD AUTO: 14.5 % — LOW (ref 20–55)
MCHC RBC-ENTMCNC: 35 PG — HIGH (ref 27–31)
MCHC RBC-ENTMCNC: 35.3 G/DL — SIGNIFICANT CHANGE UP (ref 32–36)
MCV RBC AUTO: 99.3 FL — HIGH (ref 80–94)
MONOCYTES # BLD AUTO: 0.7 K/UL — SIGNIFICANT CHANGE UP (ref 0–0.8)
MONOCYTES NFR BLD AUTO: 14.7 % — HIGH (ref 3–10)
NEUTROPHILS # BLD AUTO: 3.3 K/UL — SIGNIFICANT CHANGE UP (ref 1.8–8)
NEUTROPHILS NFR BLD AUTO: 70.2 % — SIGNIFICANT CHANGE UP (ref 37–73)
NITRITE UR-MCNC: NEGATIVE — SIGNIFICANT CHANGE UP
PCP SPEC-MCNC: SIGNIFICANT CHANGE UP
PH UR: 7 — SIGNIFICANT CHANGE UP (ref 5–8)
PLATELET # BLD AUTO: 85 K/UL — LOW (ref 150–400)
POTASSIUM SERPL-MCNC: 3.3 MMOL/L — LOW (ref 3.5–5.3)
POTASSIUM SERPL-SCNC: 3.3 MMOL/L — LOW (ref 3.5–5.3)
PROT SERPL-MCNC: 7.6 G/DL — SIGNIFICANT CHANGE UP (ref 6.6–8.7)
PROT UR-MCNC: NEGATIVE MG/DL — SIGNIFICANT CHANGE UP
RBC # BLD: 4.31 M/UL — LOW (ref 4.6–6.2)
RBC # FLD: 13.1 % — SIGNIFICANT CHANGE UP (ref 11–15.6)
SALICYLATES SERPL-MCNC: <0.6 MG/DL — LOW (ref 10–20)
SODIUM SERPL-SCNC: 139 MMOL/L — SIGNIFICANT CHANGE UP (ref 135–145)
SP GR SPEC: 1 — LOW (ref 1.01–1.02)
UROBILINOGEN FLD QL: NEGATIVE MG/DL — SIGNIFICANT CHANGE UP
WBC # BLD: 4.8 K/UL — SIGNIFICANT CHANGE UP (ref 4.8–10.8)
WBC # FLD AUTO: 4.8 K/UL — SIGNIFICANT CHANGE UP (ref 4.8–10.8)

## 2018-12-23 PROCEDURE — 99284 EMERGENCY DEPT VISIT MOD MDM: CPT

## 2018-12-23 PROCEDURE — 99281 EMR DPT VST MAYX REQ PHY/QHP: CPT

## 2018-12-23 PROCEDURE — 70450 CT HEAD/BRAIN W/O DYE: CPT | Mod: 26

## 2018-12-23 PROCEDURE — 93010 ELECTROCARDIOGRAM REPORT: CPT

## 2018-12-23 RX ORDER — HALOPERIDOL DECANOATE 100 MG/ML
1 INJECTION INTRAMUSCULAR ONCE
Qty: 0 | Refills: 0 | Status: COMPLETED | OUTPATIENT
Start: 2018-12-23 | End: 2018-12-23

## 2018-12-23 RX ADMIN — HALOPERIDOL DECANOATE 1 MILLIGRAM(S): 100 INJECTION INTRAMUSCULAR at 12:15

## 2018-12-23 NOTE — ED PROVIDER NOTE - ATTENDING CONTRIBUTION TO CARE
I personally saw the patient with the PA, and completed the key components of the history and physical exam. I then discussed the management plan with the PA.    This is a 38 year old male c/o many small worms and bees in his mouth and ears; he says this has morelia been going on for 2-3 days; he states he was showering today and saw many small worms coming from his ears and hears them inside his head. He feels that they are crawling into his brain and eating it.  Pt denies any psych history, drugs, alcohol, or FH of psychiatric illness.  No HI/SI.  Pt states [place] has been in the [place] and works cleaning houses and "with ceramic".   He denies any abdominal pain, diarrhea, recent travel or rashes.      GEN - NAD; well appearing; A+O x3, unkempt  HEAD - NC/AT     ENT - PEERL, EOMI, mucous membranes  moist , no discharge; b/l cerumen impaction; no foreign bodies    NECK: Neck supple, non-tender without lymphadenopathy  PULM - CTA b/l,  symmetric breath sounds  COR -  normal heart sounds    ABD - , ND, NT, soft, no guarding, no rebound, no masses    BACK - no CVA tenderness, nontender spine     EXTREMS - no edema, no deformity, warm and well perfused    SKIN - no rash or bruising      NEUROLOGIC - alert, no gross deficits.    IMP: well appearing male c/o delusions of insects swarming his body and crawling into him.  NO evident insects seen on pt.  Pt otherwise alert, neuro intact. No evident signs of intoxication; will refer to  for further evaluation.
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
## 2018-12-23 NOTE — ED PROVIDER NOTE - PROGRESS NOTE DETAILS
labs reviewed, patient sent to  BH evaluated patient relates psychosis to steroids RX yesterday ED visit for back pain, cleared head CT reviewed WNL patient re-evaluated, after haldol, feeling better, reports no worms or bees in ears or head. PT notes complete resolution of prior symptoms.  Smiling;  feeling well for home. PT instructed to f/u with PCP

## 2018-12-23 NOTE — ED BEHAVIORAL HEALTH ASSESSMENT NOTE - RISK ASSESSMENT
The patient is not felt to be a danger to self or others at this time. There is no alcohol or drug use. There are no legal issues

## 2018-12-23 NOTE — ED BEHAVIORAL HEALTH ASSESSMENT NOTE - SUMMARY
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 His symptoms were both hypnagogic and hypnopompic. Currently he denies any drug or alcohol use since leaving ER. His cognition is currently intact. He is denying any hallucinations, delusions were not elicited.
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
## 2018-12-23 NOTE — ED BEHAVIORAL HEALTH ASSESSMENT NOTE - PATIENT'S CHIEF COMPLAINT
Episodes of hallucinations for the first time after waking up last night at 2am and the second time around 5 am.

## 2018-12-24 PROCEDURE — 80307 DRUG TEST PRSMV CHEM ANLYZR: CPT

## 2018-12-24 PROCEDURE — 70450 CT HEAD/BRAIN W/O DYE: CPT

## 2018-12-24 PROCEDURE — 93005 ELECTROCARDIOGRAM TRACING: CPT

## 2018-12-24 PROCEDURE — 36415 COLL VENOUS BLD VENIPUNCTURE: CPT

## 2018-12-24 PROCEDURE — 80053 COMPREHEN METABOLIC PANEL: CPT

## 2018-12-24 PROCEDURE — T1013: CPT

## 2018-12-24 PROCEDURE — 99284 EMERGENCY DEPT VISIT MOD MDM: CPT

## 2018-12-24 PROCEDURE — 85027 COMPLETE CBC AUTOMATED: CPT

## 2018-12-24 PROCEDURE — 81003 URINALYSIS AUTO W/O SCOPE: CPT

## 2018-12-26 PROCEDURE — 84157 ASSAY OF PROTEIN OTHER: CPT

## 2018-12-26 PROCEDURE — 87483 CNS DNA AMP PROBE TYPE 12-25: CPT

## 2018-12-26 PROCEDURE — 36415 COLL VENOUS BLD VENIPUNCTURE: CPT

## 2018-12-26 PROCEDURE — 87205 SMEAR GRAM STAIN: CPT

## 2018-12-26 PROCEDURE — 87476 LYME DIS DNA AMP PROBE: CPT

## 2018-12-26 PROCEDURE — 72146 MRI CHEST SPINE W/O DYE: CPT

## 2018-12-26 PROCEDURE — 85730 THROMBOPLASTIN TIME PARTIAL: CPT

## 2018-12-26 PROCEDURE — 72148 MRI LUMBAR SPINE W/O DYE: CPT

## 2018-12-26 PROCEDURE — 83916 OLIGOCLONAL BANDS: CPT

## 2018-12-26 PROCEDURE — 86780 TREPONEMA PALLIDUM: CPT

## 2018-12-26 PROCEDURE — 70450 CT HEAD/BRAIN W/O DYE: CPT

## 2018-12-26 PROCEDURE — T1013: CPT

## 2018-12-26 PROCEDURE — 89051 BODY FLUID CELL COUNT: CPT

## 2018-12-26 PROCEDURE — 85027 COMPLETE CBC AUTOMATED: CPT

## 2018-12-26 PROCEDURE — 84443 ASSAY THYROID STIM HORMONE: CPT

## 2018-12-26 PROCEDURE — 87070 CULTURE OTHR SPECIMN AEROBIC: CPT

## 2018-12-26 PROCEDURE — 82607 VITAMIN B-12: CPT

## 2018-12-26 PROCEDURE — 84484 ASSAY OF TROPONIN QUANT: CPT

## 2018-12-26 PROCEDURE — 80048 BASIC METABOLIC PNL TOTAL CA: CPT

## 2018-12-26 PROCEDURE — 83036 HEMOGLOBIN GLYCOSYLATED A1C: CPT

## 2018-12-26 PROCEDURE — 72141 MRI NECK SPINE W/O DYE: CPT

## 2018-12-26 PROCEDURE — 86666 EHRLICHIA ANTIBODY: CPT

## 2018-12-26 PROCEDURE — 83921 ORGANIC ACID SINGLE QUANT: CPT

## 2018-12-26 PROCEDURE — 80053 COMPREHEN METABOLIC PANEL: CPT

## 2018-12-26 PROCEDURE — 71045 X-RAY EXAM CHEST 1 VIEW: CPT

## 2018-12-26 PROCEDURE — 70551 MRI BRAIN STEM W/O DYE: CPT

## 2018-12-26 PROCEDURE — 86592 SYPHILIS TEST NON-TREP QUAL: CPT

## 2018-12-26 PROCEDURE — 80307 DRUG TEST PRSMV CHEM ANLYZR: CPT

## 2018-12-26 PROCEDURE — 81001 URINALYSIS AUTO W/SCOPE: CPT

## 2018-12-26 PROCEDURE — 83880 ASSAY OF NATRIURETIC PEPTIDE: CPT

## 2018-12-26 PROCEDURE — 85610 PROTHROMBIN TIME: CPT

## 2018-12-26 PROCEDURE — 87798 DETECT AGENT NOS DNA AMP: CPT

## 2018-12-26 PROCEDURE — 74177 CT ABD & PELVIS W/CONTRAST: CPT

## 2018-12-26 PROCEDURE — 82164 ANGIOTENSIN I ENZYME TEST: CPT

## 2018-12-26 PROCEDURE — 99285 EMERGENCY DEPT VISIT HI MDM: CPT | Mod: 25

## 2018-12-26 PROCEDURE — 83690 ASSAY OF LIPASE: CPT

## 2018-12-26 PROCEDURE — 82746 ASSAY OF FOLIC ACID SERUM: CPT

## 2018-12-26 PROCEDURE — 93005 ELECTROCARDIOGRAM TRACING: CPT

## 2018-12-26 PROCEDURE — 82945 GLUCOSE OTHER FLUID: CPT

## 2018-12-26 PROCEDURE — 86703 HIV-1/HIV-2 1 RESULT ANTBDY: CPT

## 2018-12-26 PROCEDURE — 82553 CREATINE MB FRACTION: CPT

## 2018-12-26 PROCEDURE — 86235 NUCLEAR ANTIGEN ANTIBODY: CPT

## 2018-12-26 PROCEDURE — 86038 ANTINUCLEAR ANTIBODIES: CPT

## 2018-12-26 PROCEDURE — 82550 ASSAY OF CK (CPK): CPT

## 2018-12-26 PROCEDURE — 71260 CT THORAX DX C+: CPT

## 2018-12-26 PROCEDURE — 86334 IMMUNOFIX E-PHORESIS SERUM: CPT

## 2018-12-26 PROCEDURE — 62270 DX LMBR SPI PNXR: CPT

## 2018-12-26 PROCEDURE — 96360 HYDRATION IV INFUSION INIT: CPT | Mod: XU

## 2018-12-26 PROCEDURE — 83090 ASSAY OF HOMOCYSTEINE: CPT

## 2018-12-26 PROCEDURE — 83615 LACTATE (LD) (LDH) ENZYME: CPT

## 2019-01-03 ENCOUNTER — APPOINTMENT (OUTPATIENT)
Dept: ORTHOPEDIC SURGERY | Facility: CLINIC | Age: 39
End: 2019-01-03

## 2019-01-03 DIAGNOSIS — M54.2 CERVICALGIA: ICD-10-CM

## 2019-01-09 ENCOUNTER — EMERGENCY (EMERGENCY)
Facility: HOSPITAL | Age: 39
LOS: 1 days | Discharge: HOME | End: 2019-01-09
Attending: EMERGENCY MEDICINE
Payer: MEDICAID

## 2019-01-09 VITALS
SYSTOLIC BLOOD PRESSURE: 156 MMHG | HEART RATE: 77 BPM | OXYGEN SATURATION: 99 % | WEIGHT: 164.91 LBS | RESPIRATION RATE: 18 BRPM | TEMPERATURE: 100 F | DIASTOLIC BLOOD PRESSURE: 93 MMHG

## 2019-01-09 DIAGNOSIS — S01.01XA LACERATION WITHOUT FOREIGN BODY OF SCALP, INITIAL ENCOUNTER: Chronic | ICD-10-CM

## 2019-01-09 DIAGNOSIS — M75.102 UNSPECIFIED ROTATOR CUFF TEAR OR RUPTURE OF LEFT SHOULDER, NOT SPECIFIED AS TRAUMATIC: Chronic | ICD-10-CM

## 2019-01-09 DIAGNOSIS — Z98.89 OTHER SPECIFIED POSTPROCEDURAL STATES: Chronic | ICD-10-CM

## 2019-01-09 PROCEDURE — 99284 EMERGENCY DEPT VISIT MOD MDM: CPT | Mod: 25

## 2019-01-09 PROCEDURE — 93010 ELECTROCARDIOGRAM REPORT: CPT

## 2019-01-10 VITALS — DIASTOLIC BLOOD PRESSURE: 90 MMHG | SYSTOLIC BLOOD PRESSURE: 136 MMHG | HEART RATE: 66 BPM

## 2019-01-10 LAB
ALBUMIN SERPL ELPH-MCNC: 4.9 G/DL — SIGNIFICANT CHANGE UP (ref 3.3–5.2)
ALP SERPL-CCNC: 56 U/L — SIGNIFICANT CHANGE UP (ref 40–120)
ALT FLD-CCNC: 62 U/L — HIGH
AMPHET UR-MCNC: NEGATIVE — SIGNIFICANT CHANGE UP
ANION GAP SERPL CALC-SCNC: 12 MMOL/L — SIGNIFICANT CHANGE UP (ref 5–17)
APPEARANCE UR: CLEAR — SIGNIFICANT CHANGE UP
APTT BLD: 32.2 SEC — SIGNIFICANT CHANGE UP (ref 27.5–36.3)
AST SERPL-CCNC: 65 U/L — HIGH
BARBITURATES UR SCN-MCNC: NEGATIVE — SIGNIFICANT CHANGE UP
BENZODIAZ UR-MCNC: NEGATIVE — SIGNIFICANT CHANGE UP
BILIRUB SERPL-MCNC: 0.5 MG/DL — SIGNIFICANT CHANGE UP (ref 0.4–2)
BILIRUB UR-MCNC: NEGATIVE — SIGNIFICANT CHANGE UP
BUN SERPL-MCNC: 12 MG/DL — SIGNIFICANT CHANGE UP (ref 8–20)
CALCIUM SERPL-MCNC: 10 MG/DL — SIGNIFICANT CHANGE UP (ref 8.6–10.2)
CHLORIDE SERPL-SCNC: 99 MMOL/L — SIGNIFICANT CHANGE UP (ref 98–107)
CO2 SERPL-SCNC: 25 MMOL/L — SIGNIFICANT CHANGE UP (ref 22–29)
COCAINE METAB.OTHER UR-MCNC: NEGATIVE — SIGNIFICANT CHANGE UP
COLOR SPEC: YELLOW — SIGNIFICANT CHANGE UP
CREAT SERPL-MCNC: 0.64 MG/DL — SIGNIFICANT CHANGE UP (ref 0.5–1.3)
DIFF PNL FLD: NEGATIVE — SIGNIFICANT CHANGE UP
GLUCOSE SERPL-MCNC: 127 MG/DL — HIGH (ref 70–115)
GLUCOSE UR QL: NEGATIVE MG/DL — SIGNIFICANT CHANGE UP
HCT VFR BLD CALC: 45.7 % — SIGNIFICANT CHANGE UP (ref 42–52)
HGB BLD-MCNC: 15.9 G/DL — SIGNIFICANT CHANGE UP (ref 14–18)
INR BLD: 0.94 RATIO — SIGNIFICANT CHANGE UP (ref 0.88–1.16)
KETONES UR-MCNC: NEGATIVE — SIGNIFICANT CHANGE UP
LEUKOCYTE ESTERASE UR-ACNC: NEGATIVE — SIGNIFICANT CHANGE UP
MCHC RBC-ENTMCNC: 34.6 PG — HIGH (ref 27–31)
MCHC RBC-ENTMCNC: 34.8 G/DL — SIGNIFICANT CHANGE UP (ref 32–36)
MCV RBC AUTO: 99.3 FL — HIGH (ref 80–94)
METHADONE UR-MCNC: NEGATIVE — SIGNIFICANT CHANGE UP
NITRITE UR-MCNC: NEGATIVE — SIGNIFICANT CHANGE UP
OPIATES UR-MCNC: NEGATIVE — SIGNIFICANT CHANGE UP
PCP SPEC-MCNC: SIGNIFICANT CHANGE UP
PCP UR-MCNC: NEGATIVE — SIGNIFICANT CHANGE UP
PH UR: 7 — SIGNIFICANT CHANGE UP (ref 5–8)
PLATELET # BLD AUTO: 127 K/UL — LOW (ref 150–400)
POTASSIUM SERPL-MCNC: 4.2 MMOL/L — SIGNIFICANT CHANGE UP (ref 3.5–5.3)
POTASSIUM SERPL-SCNC: 4.2 MMOL/L — SIGNIFICANT CHANGE UP (ref 3.5–5.3)
PROT SERPL-MCNC: 7.7 G/DL — SIGNIFICANT CHANGE UP (ref 6.6–8.7)
PROT UR-MCNC: NEGATIVE MG/DL — SIGNIFICANT CHANGE UP
PROTHROM AB SERPL-ACNC: 10.8 SEC — SIGNIFICANT CHANGE UP (ref 10–12.9)
RBC # BLD: 4.6 M/UL — SIGNIFICANT CHANGE UP (ref 4.6–6.2)
RBC # FLD: 13.1 % — SIGNIFICANT CHANGE UP (ref 11–15.6)
SODIUM SERPL-SCNC: 136 MMOL/L — SIGNIFICANT CHANGE UP (ref 135–145)
SP GR SPEC: 1 — LOW (ref 1.01–1.02)
THC UR QL: NEGATIVE — SIGNIFICANT CHANGE UP
TROPONIN T SERPL-MCNC: <0.01 NG/ML — SIGNIFICANT CHANGE UP (ref 0–0.06)
UROBILINOGEN FLD QL: NEGATIVE MG/DL — SIGNIFICANT CHANGE UP
WBC # BLD: 5.4 K/UL — SIGNIFICANT CHANGE UP (ref 4.8–10.8)
WBC # FLD AUTO: 5.4 K/UL — SIGNIFICANT CHANGE UP (ref 4.8–10.8)

## 2019-01-10 PROCEDURE — 71046 X-RAY EXAM CHEST 2 VIEWS: CPT

## 2019-01-10 PROCEDURE — 93005 ELECTROCARDIOGRAM TRACING: CPT

## 2019-01-10 PROCEDURE — 36415 COLL VENOUS BLD VENIPUNCTURE: CPT

## 2019-01-10 PROCEDURE — 71046 X-RAY EXAM CHEST 2 VIEWS: CPT | Mod: 26

## 2019-01-10 PROCEDURE — 81003 URINALYSIS AUTO W/O SCOPE: CPT

## 2019-01-10 PROCEDURE — 85027 COMPLETE CBC AUTOMATED: CPT

## 2019-01-10 PROCEDURE — T1013: CPT

## 2019-01-10 PROCEDURE — 80307 DRUG TEST PRSMV CHEM ANLYZR: CPT

## 2019-01-10 PROCEDURE — 99284 EMERGENCY DEPT VISIT MOD MDM: CPT | Mod: 25

## 2019-01-10 PROCEDURE — 85730 THROMBOPLASTIN TIME PARTIAL: CPT

## 2019-01-10 PROCEDURE — 85610 PROTHROMBIN TIME: CPT

## 2019-01-10 PROCEDURE — 80053 COMPREHEN METABOLIC PANEL: CPT

## 2019-01-10 PROCEDURE — 84484 ASSAY OF TROPONIN QUANT: CPT

## 2019-01-10 NOTE — ED PROVIDER NOTE - NS_ ATTENDINGSCRIBEDETAILS _ED_A_ED_FT
I, KIMBERLEY Jones MD, personally performed the services described in the documentation, reviewed the documentation recorded by the scribe in my presence and it accurately and completely records my words and action

## 2019-01-10 NOTE — ED PROVIDER NOTE - NS ED ROS FT
no weight change, no fever or chills  no recent travel, no recent abox, no sick contacts  no recent change in medications  no rash, no bruises  no visual changes no eye discharge  no cough cold or congestion,   + sob, + chest pain  no orthopnea, no pnd  no abd pain, no n/v/d  no hematuria, no change in urinary habits  no joint pain, no deformity  no headache, no paresthesia no weight change, no fever or chills  no recent travel, no recent abox, no sick contacts  no recent change in medications  no rash, no bruises  no visual changes no eye discharge  no cough cold or congestion,   + sob, + chest pain  no orthopnea, no pnd  no abd pain, no n/v/d  no hematuria, no change in urinary habits  no joint pain, no deformity  no headache, no paresthesia  + numbness

## 2019-01-10 NOTE — ED PROVIDER NOTE - PHYSICAL EXAMINATION
Constitutional : Appears comfortably, talking in full sentences  Head :NC AT , no swelling  Eyes :eomi, no swelling  Mouth :mm moist,  Neck : supple, trachea in midline  Chest :Tim air entry, symm chest expansion, no distress  Heart :S1 S2 distant  Abdomen :abd soft, non tender  Musc/Skel :ext no swelling, no deformity, no spine tenderness, distal pulses present  Neuro  :AAO 3 no focal deficits
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
## 2019-01-10 NOTE — ED ADULT NURSE NOTE - NSIMPLEMENTINTERV_GEN_ALL_ED
Implemented All Universal Safety Interventions:  [place] to call system. Call bell, personal items and telephone within reach. Instruct patient to call for assistance. Room bathroom lighting operational. Non-slip footwear when patient is off stretcher. Physically safe environment: no spills, clutter or unnecessary equipment. Stretcher in lowest position, wheels locked, appropriate side rails in place.

## 2019-01-10 NOTE — ED PROVIDER NOTE - PROGRESS NOTE DETAILS
pt became teary when talking about the loss of his father. history, ros conducted with , exam conducted  results explained, meds side effects explained, via , questions answered, and concerns addressed  follow up with clinic advised
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
## 2019-01-10 NOTE — ED ADULT NURSE NOTE - OBJECTIVE STATEMENT
Patient presents with chest pain, shortness of breath while sleeping. patient is a&ox3, denies nausea/vomiting.

## 2019-01-15 NOTE — ED ADULT NURSE NOTE - INTERPRETER'S NAME
mary jane Transposition Flap Text: The defect edges were debeveled with a #15 scalpel blade.  Given the location of the defect and the proximity to free margins a transposition flap was deemed most appropriate.  Using a sterile surgical marker, an appropriate transposition flap was drawn incorporating the defect.    The area thus outlined was incised deep to adipose tissue with a #15 scalpel blade.  The skin margins were undermined to an appropriate distance in all directions utilizing iris scissors.

## 2019-04-05 NOTE — ED PROVIDER NOTE - CHIEF COMPLAINT
The patient is a 38y Male complaining of sore throat. Improving, continue IVF, monitor renal function. Avoid nephrotoxic medications.

## 2019-05-26 ENCOUNTER — EMERGENCY (EMERGENCY)
Facility: HOSPITAL | Age: 39
LOS: 1 days | Discharge: HOME | End: 2019-05-26
Attending: EMERGENCY MEDICINE
Payer: MEDICAID

## 2019-05-26 VITALS — WEIGHT: 154.98 LBS | HEIGHT: 65 IN

## 2019-05-26 VITALS — DIASTOLIC BLOOD PRESSURE: 92 MMHG | SYSTOLIC BLOOD PRESSURE: 145 MMHG

## 2019-05-26 DIAGNOSIS — S01.01XA LACERATION WITHOUT FOREIGN BODY OF SCALP, INITIAL ENCOUNTER: Chronic | ICD-10-CM

## 2019-05-26 DIAGNOSIS — M75.102 UNSPECIFIED ROTATOR CUFF TEAR OR RUPTURE OF LEFT SHOULDER, NOT SPECIFIED AS TRAUMATIC: Chronic | ICD-10-CM

## 2019-05-26 DIAGNOSIS — Z98.89 OTHER SPECIFIED POSTPROCEDURAL STATES: Chronic | ICD-10-CM

## 2019-05-26 PROCEDURE — T1013: CPT

## 2019-05-26 PROCEDURE — 73562 X-RAY EXAM OF KNEE 3: CPT

## 2019-05-26 PROCEDURE — 73562 X-RAY EXAM OF KNEE 3: CPT | Mod: 26,RT

## 2019-05-26 PROCEDURE — 99283 EMERGENCY DEPT VISIT LOW MDM: CPT

## 2019-05-26 RX ORDER — IBUPROFEN 200 MG
600 TABLET ORAL ONCE
Refills: 0 | Status: COMPLETED | OUTPATIENT
Start: 2019-05-26 | End: 2019-05-26

## 2019-05-26 RX ADMIN — Medication 600 MILLIGRAM(S): at 15:37

## 2019-05-26 NOTE — ED STATDOCS - NS ED ROS FT
ROS: (+) knee pain, swelling (-) No fever/chills. No eye pain/changes in vision, No ear pain/sore throat/dysphagia, No chest pain/palpitations. No SOB/cough/. No abdominal pain, N/V/D, no black/bloody bm. No dysuria/frequency/discharge, No headache. No Dizziness.    No rashes.

## 2019-05-26 NOTE — ED STATDOCS - CLINICAL SUMMARY MEDICAL DECISION MAKING FREE TEXT BOX
R knee injury 2 weeks ago, pt ambulating with pain, obvious diffusion with tenderness. Will order x-ray, symptom control, if x-ray negative will give crutches, limited weight bearing and ortho followup

## 2019-05-26 NOTE — ED STATDOCS - ATTENDING CONTRIBUTION TO CARE
[place]: I performed a face to face bedside interview with patient regarding history of present illness, review of symptoms and past medical history. I completed an independent physical exam and ordered tests/medications as needed.  I have discussed patient's plan of care with advanced care provider. The advanced care provider assisted in  executing the discussed plan. I was available for any questions or issues that may have arose during the execution of the plan of care.

## 2019-05-26 NOTE — ED STATDOCS - PHYSICAL EXAMINATION
Gen: No acute distress, non toxic  HEENT: Mucous membranes moist, pink conjunctivae, EOMI  CV: RRR, nl s1/s2.  Resp: CTAB, normal rate and effort  GI: Abdomen soft, NT, ND. No rebound, no guarding  : No CVAT  Neuro: A&O x 3, moving all 4 extremities  MSK: R knee with moderate swelling, and some moderate tenderness to medial aspect. No erythema or warmth, FROM. Ambulates with antalgic gait.   Skin: No rashes. intact and perfused. Gen: No acute distress, non toxic  HEENT: Mucous membranes moist, pink conjunctivae, EOMI  CV: RRR, nl s1/s2.  Resp: CTAB, normal rate and effort  GI: Abdomen soft, NT, ND. No rebound, no guarding  : No CVAT  Neuro: A&O x 3, moving all 4 extremities  MSK: R knee with moderate swelling, and some moderate tenderness to medial aspect just laterall to patellar. No erythema or warmth, FROM. Ambulates with antalgic gait.   Skin: No rashes. intact and perfused.

## 2019-05-26 NOTE — ED PROCEDURE NOTE - CPROC ED POST PROC CARE GUIDE1
Elevate the injured extremity as instructed./Instructed patient/caregiver to follow-up with primary care physician./Instructed patient/caregiver regarding signs and symptoms of infection./Verbal/written post procedure instructions were given to patient/caregiver./Keep the cast/splint/dressing clean and dry.

## 2019-05-26 NOTE — ED STATDOCS - OBJECTIVE STATEMENT
38 y/o M pt with no pert. hx presents to the ED c/o worsening R knee pain with assoc. swelling. Pt's pain worsens with movement. 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 (-) head trauma. Pt has been taking Tylenol for pain with no relief. Pt has not been able to go back to work due to pain. Pt is not currently on meds. Denies vomiting and nausea. No further complaints at this time.

## 2019-05-26 NOTE — ED STATDOCS - PROGRESS NOTE DETAILS
PT evaluated by intake physician. HPI/PE/ROS as noted above. Will follow up plan per intake physician. Acute Ed read of Xray shows no acute fractures/ dislocation. PT aware if secondary reading of imaging changes they will be notified. Pt placed in knee immobilizer and given cruthes. PT educate don RICE therapy and follow up with ORtho. PT verbalized understanding of diagnosis and importance of follow up at PMD. PT educated on importance of follow up and when to return to the ED.
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
## 2022-03-17 NOTE — ED BEHAVIORAL HEALTH ASSESSMENT NOTE - OTHER
friend Winlevi Counseling:  I discussed with the patient the risks of topical clascoterone including but not limited to erythema, scaling, itching, and stinging. Patient voiced their understanding.

## 2022-05-16 NOTE — PATIENT PROFILE ADULT. - PRO INTERPRETER NEED 2
BIB mom s/p fall 2 days ago- pt was running and tripped. No head injury/ LOC or vomiting reported. Mom concerned for pt with rt leg/ foot pain. Pt ambulating in triage with limp, pointing to rt knee when asked where it hurts. +swelling to rt knee. No meds given today. PMH Trisomy 21/ NKDA [place]

## 2022-06-08 NOTE — ED PROCEDURE NOTE - CPROC ED LUMBAR PUNCT DETAIL1
Interspace located. Using sterile technique, the area was anesthetized. The needle was slowly inserted and advanced at the appropriate angle into the subarachnoid space to allow CSF return. Stylet withdrawn. Cerebral Spinal Fluid was evaluated and any required specimens were drawn. Stylet replaced, needle removed, skin cleaned, sterile dressing applied. Patient placed in supine position. detailed exam

## 2022-06-24 NOTE — ED PROVIDER NOTE - PMH
No pertinent past medical history Finasteride Counseling:  I discussed with the patient the risks of use of finasteride including but not limited to decreased libido, decreased ejaculate volume, gynecomastia, and depression. Women should not handle medication.  All of the patient's questions and concerns were addressed. Finasteride Male Counseling: Finasteride Counseling:  I discussed with the patient the risks of use of finasteride including but not limited to decreased libido, decreased ejaculate volume, gynecomastia, and depression. Women should not handle medication.  All of the patient's questions and concerns were addressed.
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
## 2022-12-19 NOTE — ED ADULT TRIAGE NOTE - NS ED NOTE AC HIGH RISK COUNTRIES
No Patient tolerated procedure well. Patient tolerated procedure well. Patient tolerated procedure well. Patient tolerated procedure well.

## 2023-01-19 ENCOUNTER — EMERGENCY (EMERGENCY)
Facility: HOSPITAL | Age: 43
LOS: 1 days | Discharge: HOME | End: 2023-01-19
Attending: EMERGENCY MEDICINE
Payer: SELF-PAY

## 2023-01-19 VITALS
TEMPERATURE: 99 F | OXYGEN SATURATION: 97 % | RESPIRATION RATE: 20 BRPM | DIASTOLIC BLOOD PRESSURE: 79 MMHG | HEART RATE: 91 BPM | WEIGHT: 154.98 LBS | SYSTOLIC BLOOD PRESSURE: 135 MMHG

## 2023-01-19 DIAGNOSIS — Z98.89 OTHER SPECIFIED POSTPROCEDURAL STATES: Chronic | ICD-10-CM

## 2023-01-19 DIAGNOSIS — S01.01XA LACERATION WITHOUT FOREIGN BODY OF SCALP, INITIAL ENCOUNTER: Chronic | ICD-10-CM

## 2023-01-19 DIAGNOSIS — M75.102 UNSPECIFIED ROTATOR CUFF TEAR OR RUPTURE OF LEFT SHOULDER, NOT SPECIFIED AS TRAUMATIC: Chronic | ICD-10-CM

## 2023-01-19 PROCEDURE — 99283 EMERGENCY DEPT VISIT LOW MDM: CPT | Mod: 25

## 2023-01-19 PROCEDURE — 99282 EMERGENCY DEPT VISIT SF MDM: CPT

## 2023-01-19 PROCEDURE — 69210 REMOVE IMPACTED EAR WAX UNI: CPT

## 2023-01-19 PROCEDURE — 69209 REMOVE IMPACTED EAR WAX UNI: CPT | Mod: 50

## 2023-01-19 NOTE — ED ADULT NURSE NOTE - CAS ELECT INFOMATION PROVIDED
DC instructions given by MD and verbalized understanding. Pt remains alert and O x4. NAD noted. Resp E/U. Pt denies any pain at this time./DC instructions

## 2023-01-19 NOTE — ED PROVIDER NOTE - PATIENT PORTAL LINK FT
You can access the FollowMyHealth Patient Portal offered by [facility] by registering at the following website: [email or web address]. By joining [facility]’s FollowMyHealth portal, you will also be able to view your health information using other applications (apps) compatible with our system.

## 2023-01-19 NOTE — ED PROVIDER NOTE - NSFOLLOWUPINSTRUCTIONS_ED_ALL_ED_FT
do not use Q tip   call and follow up with ENT     Acumulación de cera en el oído en adultos    Earwax Buildup, Adult      Los oídos producen oralia sustancia llamada cera que ayuda a evitar que ingresen bacterias en el oído y protege la piel del canal auditivo. En ocasiones, la cera se puede acumular en el oído y causar molestias o pérdida de la audición.      ¿Cuáles son las causas?    Esta afección es causada por oralia acumulación de cera. Los abraham auditivos se limpian por sí solos. La cera de los oídos se produce en la parte externa del canal auditivo y generalmente  hacia afuera en pequeñas cantidades con el tiempo.    Cuando el mecanismo de autolimpieza no funciona, la cera se acumula y eso puede reducir la audición y causar molestias. Tratar de limpiar los oídos con hisopos puede empujar la cera hacia el interior del canal auditivo y causar dolor y disminución de la audición.      ¿Qué incrementa el riesgo?    Es más probable que esta afección se manifieste en las personas que presentan alguna de estas características:  •Se limpian frecuentemente los oídos con hisopos.      •Se escarban los oídos.      •Utilizan tapones para los oídos o auriculares internos con frecuencia, o usan audífonos.      Los siguientes factores también pueden hacer que usted sea más propenso a desarrollar esta afección:  •Ser hombre.      •Ser oralia persona de edad avanzada.      •Producir más cera de forma natural.      •Tener los abraham auditivos estrechos.      •Tener cera que es demasiado densa o pegajosa.      •Tener vello excesivo en el canal auditivo.      •Tener eccema.      •Estar deshidratado.        ¿Cuáles son los signos o síntomas?    Los síntomas de esta afección incluyen:  •Disminución de la audición.      •Sensación de que el oído está lleno u obstruido.      •Secreción de líquido.      •Dolor o picor en el oído.      •Zumbidos en el oído.      •Tos.      •Trastornos del equilibrio.      •Porción de cera que se puede observar en el interior del canal auditivo.        ¿Cómo se diagnostica?    Esta afección se puede diagnosticar en función de lo siguiente:  •Pio síntomas.      •Pio antecedentes médicos.      •Un examen de oído. Ethan el examen, el médico mirará dentro de mejia oído con un instrumento llamado otoscopio.      Pueden hacerle otros estudios, kristin oralia prueba de audición.      ¿Cómo se trata?    El tratamiento para esta afección puede incluir lo siguiente:  •Gotas óticas para ablandar la cera.    •Extracción de cera realizada por un médico. El médico también podrá hacer lo siguiente:  •Enjuagar el oído con agua.      •Usar un instrumento con un anillo en el extremo (cureta).      •Usar un dispositivo de succión.        •Realizar oralia cirugía para extraer la acumulación de cera. [place] puede realizarse en casos graves.        Siga estas instrucciones en mejia casa:     •Use los medicamentos de venta david y los recetados solamente kristin se lo haya indicado el médico.      • No se introduzca ningún objeto en el oído, ni siquiera hisopos de algodón. La abertura del canal auditivo se puede limpiar con un paño o pañuelo facial.      •Siga las instrucciones del médico acerca de cómo limpiarse los oídos. No se limpie los oídos en exceso.      •Beber suficiente líquido kristin para mantener la orina de color amarillo pálido. [place] ayudará a diluir la cera.      •Concurra a todas las visitas de seguimiento kristin se lo hayan indicado. Si tiene acumulación de cera en el oído con frecuencia o usa audífonos, visite a meija médico para que le realice oralia limpieza de oído de rutina y preventiva. Pregúntele al médico con qué frecuencia debe programar estas limpiezas.      •Si tiene audífonos, límpielos según las instrucciones del fabricante y de mejia médico.        Comuníquese con un médico si:    •Tiene dolor de oído.      •Presenta fiebre.      •Le sale pus u otro líquido del oído.      •Tiene pérdida de la audición.      •Tiene zumbidos en el oído que no desaparecen.      •Tiene la sensación de que la habitación da vueltas (vértigo).      •Los síntomas no mejoran con el tratamiento.        Solicite ayuda de inmediato si:    •Le sangra el oído afectado.      •Tiene dolor intenso de oído.        Resumen    •La cera se puede acumular en el oído y causar molestias o pérdida de la audición.      •Los síntomas más comunes de esta afección incluyen oralia audición reducida o atenuada y la sensación de que el oído está lleno o está obstruido.      •Esta afección se puede diagnosticar en función de los síntomas, pio antecedentes médicos y un examen de oído.      •Esta afección se puede tratar mediante gotas óticas que ablandan la cera o mediante la extracción de la cera que realiza el médico.      • No se introduzca ningún objeto en el oído, ni siquiera hisopos de algodón. La abertura del canal auditivo se puede limpiar con un paño o pañuelo facial.      Esta información no tiene kristin fin reemplazar el consejo del médico. Asegúrese de hacerle al médico cualquier pregunta que tenga.

## 2023-01-19 NOTE — ED PROVIDER NOTE - CLINICAL SUMMARY MEDICAL DECISION MAKING FREE TEXT BOX
43 y,o male no sig PMh present in ER and c.o left ear pain x 2 weeks and  hearing as well. denies any trauma or injury or bleeding from ear. denies any fever or cough or congestion.  ear wax impaction removal With warm water irrigation tolerated well

## 2023-01-19 NOTE — ED PROVIDER NOTE - PHYSICAL EXAMINATION
Const: AOX3 nontoxic appearing, no apparent respiratory or physical distress. Stable gait   HEENT: NC/AT. Moist mucous membranes. b/l TM ear wax impaction noted L>R no external ear ttp , no mastoid TTP   Eyes: JANET. EOMI  Neck: Soft and supple. Full ROM without pain.  Cardiac: Regular rate and regular rhythm.   Resp: Speaking in full sentences. No evidence of respiratory distress  Lymph: No cervical lymphadenopathy.  Neuro: Awake, alert & oriented x 3. Moves all extremities symmetrically.

## 2023-01-19 NOTE — ED PROVIDER NOTE - NSICDXPASTSURGICALHX_GEN_ALL_CORE_FT
PAST SURGICAL HISTORY:  Laceration of scalp surgical repair- 2010    Left rotator cuff tear     S/P shoulder surgery left

## 2023-01-19 NOTE — ED PROVIDER NOTE - OBJECTIVE STATEMENT
43 y,o male no sig PMh present in ER and c.o left ear pain x 2 weeks and  hearing as well. denies any trauma or injury or bleeding from ear. denies any fever or cough or congestion.  Edwardo montoya

## 2023-01-19 NOTE — ED PROVIDER NOTE - CARE PROVIDER_API CALL
Cl Bright)  Otolaryngology  [address] [place] [address]  Phone: [phone]  Fax: [phone]  Follow Up Time:

## 2023-01-19 NOTE — ED PROVIDER NOTE - ATTENDING APP SHARED VISIT CONTRIBUTION OF CARE
The patient discussed with PA    Ear Jenniferion    RODERICK, Carlos Shea, performed the initial face to face bedside interview with this patient regarding history of present illness, review of symptoms and relevant past medical, social and family history.  I completed an independent physical examination.  I was the initial provider who evaluated this patient. I have signed out the follow up of any pending tests (i.e. labs, radiological studies) to the ACP.  I have communicated the patient’s plan of care and disposition with the ACP.

## 2023-01-28 NOTE — ED PROCEDURE NOTE - NS ED PROCEDURE ASSISTED BY
[address] [id number]                            CARDIAC CATHETERIZATION    PATIENT NAME: Tello Liu                :        1945  MED REC NO:   [id number]                           ROOM:       0038  ACCOUNT NO:   [de-identified]                           ADMIT DATE: 2023  PROVIDER:     Pretty Betancourt MD    DATE OF PROCEDURE:  2023    INDICATION:  Acute congestive heart failure, new drop in ejection  fraction on recent echocardiogram.    PROCEDURES PERFORMED:  1. Left cardiac catheterization with selective coronary angiogram.  2.  Left ventriculogram.    DESCRIPTION OF THE PROCEDURE:  After informed consent, the patient was  brought to the cardiac catheterization room. She was prepped and draped  in a sterile fashion. 2% lidocaine was injected in the skin and  subcutaneous tissue overlying the right radial artery. Under ultrasound  guidance using modified Seldinger technique, access was obtained in the  right radial artery. 5/6 Slender sheath was inserted. Standard  antithrombotic/antispasmodic medications were given. I used the  5-[place] JR-4, 5-[place] JL-3.5 diagnostic catheters to complete the  procedure. FINDINGS:  LEFT VENTRICULOGRAM:  There is evidence for moderate global hypokinesis. The ejection fraction was estimated at 35%. HEMODYNAMICS:  LVEDP 28 mmHg. No significant pressure gradient across the aortic valve  upon pullback. CORONARY ANGIOGRAM:  1. Right coronary artery, dominant vessel, proximal segment is patent,  mid segment has 10 to 20% stenosis, distal segment is patent, gives rise  to PLB and PDA, both are tortuous but patent without significant  stenotic lesions. 2.  Left main coronary artery, patent, gives rise to left circumflex and  left anterior descending artery.   3.  Left circumflex artery, proximal segment is patent, OM1 is tortuous  but patent with mild diffuse The procedure was performed independently disease, distal LCX has mild diffuse  disease. 4.  Left anterior descending artery, proximal segment is patent, mid  segment has mild diffuse disease, D1 is patent, D2 is patent, distal LAD  is tortuous with mild diffuse disease. MEDICATIONS:  See MAR. COMPLICATIONS:  None. ESTIMATED BLOOD LOSS:  Less than 50 mL. ACCESS:  Right radial artery access. Vasc Band was applied. Hemostasis  was achieved. IMPRESSION:  Nonobstructive coronary artery disease. RECOMMENDATIONS:  Medical management.         Rubens Zapata MD    D: 01/28/2023 11:59:41       T: 01/28/2023 12:02:12     [email or web address]  Job#: [id number]     Doc#: [id number]    CC:

## 2023-05-01 ENCOUNTER — EMERGENCY (EMERGENCY)
Facility: HOSPITAL | Age: 43
LOS: 1 days | Discharge: HOME | End: 2023-05-01
Attending: STUDENT IN AN ORGANIZED HEALTH CARE EDUCATION/TRAINING PROGRAM
Payer: SELF-PAY

## 2023-05-01 VITALS
RESPIRATION RATE: 18 BRPM | TEMPERATURE: 98 F | OXYGEN SATURATION: 98 % | HEART RATE: 108 BPM | DIASTOLIC BLOOD PRESSURE: 79 MMHG | SYSTOLIC BLOOD PRESSURE: 123 MMHG

## 2023-05-01 DIAGNOSIS — Z98.89 OTHER SPECIFIED POSTPROCEDURAL STATES: Chronic | ICD-10-CM

## 2023-05-01 DIAGNOSIS — S01.01XA LACERATION WITHOUT FOREIGN BODY OF SCALP, INITIAL ENCOUNTER: Chronic | ICD-10-CM

## 2023-05-01 DIAGNOSIS — M75.102 UNSPECIFIED ROTATOR CUFF TEAR OR RUPTURE OF LEFT SHOULDER, NOT SPECIFIED AS TRAUMATIC: Chronic | ICD-10-CM

## 2023-05-01 PROCEDURE — 82962 GLUCOSE BLOOD TEST: CPT

## 2023-05-01 PROCEDURE — 99284 EMERGENCY DEPT VISIT MOD MDM: CPT

## 2023-05-01 PROCEDURE — 99285 EMERGENCY DEPT VISIT HI MDM: CPT

## 2023-05-01 PROCEDURE — T1013: CPT
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
## 2023-05-01 NOTE — ED ADULT TRIAGE NOTE - BP NONINVASIVE SYSTOLIC (MM HG)
Regina Carr is a 20 year old female who presents to [facility] complains of rash which started on Tuesday  about  5 days ago. It started off as a small patch but no the area is bigger. It looked like acne and she typically gets acne to her back but \"not like this\".  She has noted some pus head on them. She is putting coconut oil on them.    She mentions that she was seen for low grade fever, body aches, rash, sore throat on 2/19 but the doctor felt it was viral and strep negative.  No meds given.  Those cold symptom are better.   Rash is very itchy and not painful.  Patient feels it maybe caused from coconut bread she ate the night prior to onset of rash.   She is here with mom.      Patient tried: took 1 benadryl yesterday / none since; tried it only once with no relief    Patient has not had such a rash in the past.    Prior visits to doctor for this particular rash: yes 2/19    --Exposure to known contact allergens:  no  --Changes in bath or laundry soap/ skin products:   no  --New clothes/ bed linen:   no  --New medications: no  --Any camping or foreign travel in past 6 weeks: no  --Any new foods:  Yes-- coconut bread    ROS:  --Cold symptoms (rhinorrhea, sore throat, rhinitis, cough):   Yes on 2/18 and seen 2/19 and all cold symptoms/ sore throat is better  --GI symptoms (nausea, vomiting, diarrhea or abd pains):  no  --Breathing difficulty (shortness of Breath or wheezing): no  --Angioedema (face, lips, tongue, hands, feet):  no  --Fever:  no  -Joint pains:  no  --Severe Headache: no    Pertinent history:  Hx of hives  no  History of sensitive skin/ rash  no  History of seasonal/ environmental allergies  no  Asthma no    PMHx: none; no hx of MRSA; NO FAMILY HX OF MRSA    ALLERGIES:  No Known Allergies    No current outpatient medications on file.     No current facility-administered medications for this visit.        Social History: nonsmoker     OBJECTIVE  Visit Vitals  /62   Pulse 84   Temp 97.6 °F  (36.4 °C) (Tympanic)   Resp 16   SpO2 98%     Gen- Alert and well nourished/well hydrated patient; In no apparent distress.      PHARYNX-  No erythema to arches; no swelling posterior pharynx; airway patent  LUNG- clear to auscultate bilat; no rale/rhonci/wheezing bilat; good inspiratory effort.    SKIN:   Location and distribution -from central anterior neck to anterior chest to between the breast;  --few sporadic spots on back but no lesions on breasts or under breast   Appearance:  raised red papules mostly discrete and few coalescing; few with almost moist appearance / few appear fluid filled and 1 or 2 lesions with yellowish head  Swelling:  no  Angioedema to lips/ tongue/ face:  no  Induration: No    ASSESSMENT:  Rash to anterior chest x 5 days-- possibly infected acne    PLAN:    --discussed allergic reactions in general and discussed acne.    We discussed shingles as well.  Will start cepahlexin.  Use topical cortisone as directed.   . Questions a/ ddressed.     --Start oral benadryl as package instructed for 1-2 days for itchy rash (use children's benadryl if patient is a child)--mario at night.  Side effects discussed.     --stop coconut bread and the oil.     --Keep all products hypoallergenic for now.     --Keep foods simple and hypoallergenic for now.   If you do not know the cause of the hives then recommend keeping a log of food if that may be a concern.     WHAT TO EXPECT--  Should improve on above plan but this is atypical. Recommend to see Dermatology (keep appt for this week).     ----OVERALL IF  WORSENING OR ANAPHYLACTIC SYMPTOMS develops----- Such as increasing hives, swelling to face/ tongue, lightheadedness or any difficulty breathing then go to ER as the allergic rash may have progressed to anaphylactic reaction.     Patient and mom accepts understanding of the above plan.    Laura Paz DO  02/24/19       123
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
## 2023-05-01 NOTE — ED PROVIDER NOTE - NSFOLLOWUPINSTRUCTIONS_ED_ALL_ED_FT
Trastorno por consumo de bebidas alcohólicas  Alcohol Use Disorder    El trastorno por consumo de bebidas alcohólicas es oralia afección en la que el consumo de alcohol altera la sumanth cotidiana. Las personas con esta afección consumen bebidas alcohólicas en exceso y no pueden controlar mejia consumo.    El trastorno por consumo de bebidas alcohólicas puede causar problemas graves en la daniella física. Puede afectar al cerebro, al corazón y a otros órganos internos. Jennifer trastorno puede aumentar el riesgo de padecer ciertos tipos de cáncer y causar problemas de daniella mental, kristin depresión o ansiedad.    ¿Cuáles son las causas?  Esta afección se debe al consumo excesivo de alcohol con el transcurso del tiempo. Algunas personas que sufren esta afección beben para enfrentarse a situaciones negativas de la sumanth o escapar de ellas. Otros beben para aliviar el dolor o los síntomas de oralia enfermedad mental.    ¿Qué incrementa el riesgo?  Es más probable que sufra esta afección si:    Tiene antecedentes familiares de trastorno por consumo de bebidas alcohólicas.  Mejia cultura fomenta el consumo de bebidas alcohólicas hasta el punto de emborracharse (intoxicación alcohólica).  Tuvo un trastorno emocional o de conducta en la infancia.  Ha sufrido abusos.  Es adolescente y:    Tiene un desempeño deficiente en la escuela.  Recibe poca supervisión o guía.  Actúa de forma impulsiva y le gusta dimitrios riesgos.    ¿Cuáles son los signos o síntomas?  Los síntomas de esta afección incluyen:    Beber más de lo que desea.  Intentar beber menos en varias ocasiones, sin éxito.  Pasar mucho tiempo pensando en el alcohol, intentando conseguir alcohol, bebiendo o recuperándose de alok bebido.  Continuar bebiendo incluso cuando esto le causa problemas graves en mejia sumanth cotidiana.  Beber cuando es peligroso hacerlo, por ejemplo, antes de conducir.  Necesitar cada vez más alcohol para obtener el mismo efecto que busca (generar tolerancia).  Tener síntomas de abstinencia al dejar de beber. Entre los síntomas de abstinencia se incluyen los siguientes:    Dificultad para dormir, que produce cansancio (fatiga).  Cambios en el estado de ánimo, con depresión y ansiedad.  Síntomas físicos, kristin frecuencia cardíaca acelerada, respiración rápida, presión arterial elevada (hipertensión), fiebre, sudoración fría o náuseas.  Convulsiones.  Confusión grave.  Tonya o sentir cosas que no existen (alucinaciones).  Temblores que no se pueden controlar.    ¿Cómo se diagnostica?  Jennifer trastorno se diagnostica mediante oralia evaluación. Para comenzar, el médico puede hacerle boo o cuatro preguntas sobre mejia consumo de alcohol o entregarle oralia prueba sencilla que deberá realizar. [place] ayudará a obtener información bonita sobre usted.    Pueden hacerle un examen físico o análisis. Pueden derivarlo a un terapeuta especializado en abuso de sustancias.    ¿Cómo se trata?     Con la educación, algunas personas con trastorno por consumo de bebidas alcohólicas pueden reducir mejia consumo. Muchas personas con jennifer trastorno no son capaces de modificar mejia comportamiento por mejia cuenta y necesitan la ayuda de especialistas en abuso de sustancias. Estos especialistas son terapeutas que pueden ayudar a diagnosticar la gravedad de mejia trastorno y a decidir qué tipo de tratamiento necesita. Los tratamientos pueden incluir lo siguiente:    Desintoxicación. La desintoxicación consiste en dejar de beber, con la supervisión y las instrucciones de los médicos. El médico puede recetarle medicamentos en la primera semana para ayudar a disminuir los síntomas de la abstinencia. La abstinencia puede ser peligrosa y potencialmente mortal. La desintoxicación puede realizarse en el hogar, en un ámbito extrahospitalario, en un centro de atención primaria, en un hospital o en un centro de tratamiento para abuso de sustancias.  Asesoramiento psicológico. Puede incluir entrevistas motivacionales (EM), terapia familiar o terapia cognitivo-conductual (TCC). Lo realizan terapeutas profesionales o especializados en el tratamiento de pacientes que abusan de sustancias. Un terapeuta puede abordar cómo cambiar mejia comportamiento de consumo de bebidas alcohólicas y cómo mantener los cambios. La terapia tiene kristin objetivos:    Identificar ester motivaciones positivas para cambiar.  Identificar y evitar aquello que lo conduzca a beber alcohol.  Enseñarle a planificar mejia cambio de comportamiento.  Proponer sistemas de apoyo que puedan ayudarlo a mantener el cambio.  Medicamentos. Los medicamentos pueden ayudar a tratar jennifer trastorno de la siguiente manera:    Disminuyen el deseo intenso de consumir.  Reducen el sentimiento positivo que experimenta al beber alcohol.  Causan oralia reacción física desagradable cuando albert alcohol (terapia de aversión).  Grupos de apoyo mutuo kristin Alcohólicos Anónimos (AA). Estos grupos son dirigidos por personas que reddy superado mejia problema con la bebida. Estos grupos brindan apoyo emocional, consejos y orientación.    Algunas personas con esta afección se benefician del tratamiento combinado que se ofrece en algunos centros de tratamiento especializados en el abuso de sustancias.    Siga estas instrucciones en mejia casa:         Medicamentos    Use los medicamentos de venta david y los recetados solamente kristin se lo haya indicado el médico.  Consulte antes de empezar a dimitrios cualquier medicamento, hierbas o suplementos nuevos.        Instrucciones generales    Pídales a ester amistades y familiares que apoyen mejia decisión de mantenerse sobrio.  Evite situaciones en las que se sirva alcohol.  Kiesha un plan para lidiar con las situaciones tentadoras.  Asista a grupos de apoyo con regularidad.  Practique pasatiempos o actividades que le gusten.  No conduzca después de beber alcohol.  Concurra a todas las visitas de seguimiento kristin se lo haya indicado el médico. [place] es importante.    ¿Cómo se previene?  Si albert alcohol:    Limite la cantidad que albert:    De 0 a 1 medida por día para las mujeres que no estén embarazadas.  De 0 a 2 medidas por día para los hombres.  Esté atento a la cantidad de alcohol que hay en las bebidas que katy. En los [place], oralia medida equivale a oralia botella de cerveza de 12 oz (355 ml), un vaso de vino de 5 oz (148 ml) o un vaso de oralia bebida alcohólica de duane graduación de 1½ oz (44 ml).  Si tiene oralia afección de daniella mental, busque tratamiento. Lleve un estilo de sumanth saludable, que puede incorporar lo siguiente:    Meditación o respiración profunda.  Realizar actividad física.  Pasar tiempo en contacto con la naturaleza.   Escuchar música.  Charlar con algún familiar o amigo de confianza.  Si eres adolescente:    No bebas alcohol. Toshia las reuniones en las que puedas tener la tentación de beber.  No tengas miedo de negarte si alguien te ofrece alcohol. Habla sin reservas acerca de por qué no quieres beber alcohol. Sé un ejemplo positivo para los demás al no consumir alcohol.  Construye relaciones con amigos que no beban.    Dónde buscar más información  Substance Abuse and Mental Health Services Administration (Administración de Servicios por Abuso de Sustancias y Daniella Mental): samhsa.gov  Alcohólicos Anónimos: aa.org    Comuníquese con un médico si:  No puede dimitrios los medicamentos kristin se lo reddy indicado.  Ester síntomas empeoran o tiene síntomas de abstinencia cuando darren de beber.  Vuelve a comenzar a beber (recaída) y los síntomas empeoran.    Solicite ayuda de inmediato si:  Tiene pensamientos acerca de lastimarse a usted mismo o a otras personas.    Si alguna vez siente que puede lastimarse o lastimar a otras personas, o tiene pensamientos de poner fin a mejia sumanth, busque ayuda de inmediato. Diríjase al departamento de emergencias más cercano o:     Comuníquese con el servicio de emergencias de mejia localidad (911 en los [place]).  Llame a oralia línea de asistencia al suicida y atención en crisis kristin National Suicide Prevention Lifeline (Línea Nacional de Prevención del Suicidio) al 6-289-066-1368. Está disponible las 24 horas del día en los . [place]  Envíe un mensaje de texto a la línea para casos de crisis al [id number] (en los [facility] UU.).    Resumen  El trastorno por consumo de bebidas alcohólicas es oralia afección en la que el consumo de alcohol altera la sumanth cotidiana. Las personas con esta afección consumen bebidas alcohólicas en exceso y no pueden controlar mejia consumo.  El tratamiento puede incluir desintoxicación, asesoramiento psicológico, medicamentos y grupos de apoyo.  Pida ayuda a amigos y familiares. Evite situaciones en las que se sirva alcohol.  Busque ayuda de inmediato si tiene pensamientos acerca de lastimarse a usted mismo o a otras personas.    NOTAS ADICIONALES E INSTRUCCIONES    Please follow up with your Primary MD in 24-48 hr.  Seek immediate medical care for any new/worsening signs or symptoms.
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
## 2023-05-01 NOTE — ED PROVIDER NOTE - PATIENT PORTAL LINK FT
You can access the FollowMyHealth Patient Portal offered by [facility] by registering at the following website: [email or web address]. By joining [employer]’s FollowMyHealth portal, you will also be able to view your health information using other applications (apps) compatible with our system.

## 2023-05-01 NOTE — ED ADULT NURSE NOTE - NSIMPLEMENTINTERV_GEN_ALL_ED
Implemented All Fall Risk Interventions:  [place] to call system. Call bell, personal items and telephone within reach. Instruct patient to call for assistance. Room bathroom lighting operational. Non-slip footwear when patient is off stretcher. Physically safe environment: no spills, clutter or unnecessary equipment. Stretcher in lowest position, wheels locked, appropriate side rails in place. Provide visual cue, wrist band, yellow gown, etc. Monitor gait and stability. Monitor for mental status changes and reorient to person, place, and time. Review medications for side effects contributing to fall risk. Reinforce activity limits and safety measures with patient and family.

## 2023-10-18 ENCOUNTER — APPOINTMENT (OUTPATIENT)
Dept: FAMILY MEDICINE | Facility: CLINIC | Age: 43
End: 2023-10-18
Payer: SELF-PAY

## 2023-10-18 VITALS
OXYGEN SATURATION: 96 % | TEMPERATURE: 98.3 F | SYSTOLIC BLOOD PRESSURE: 112 MMHG | RESPIRATION RATE: 16 BRPM | HEIGHT: 64 IN | WEIGHT: 152 LBS | DIASTOLIC BLOOD PRESSURE: 72 MMHG | BODY MASS INDEX: 25.95 KG/M2 | HEART RATE: 72 BPM

## 2023-10-18 DIAGNOSIS — R51.9 HEADACHE, UNSPECIFIED: ICD-10-CM

## 2023-10-18 DIAGNOSIS — Z87.891 PERSONAL HISTORY OF NICOTINE DEPENDENCE: ICD-10-CM

## 2023-10-18 DIAGNOSIS — Z87.898 PERSONAL HISTORY OF OTHER SPECIFIED CONDITIONS: ICD-10-CM

## 2023-10-18 DIAGNOSIS — Z00.00 ENCOUNTER FOR GENERAL ADULT MEDICAL EXAMINATION W/OUT ABNORMAL FINDINGS: ICD-10-CM

## 2023-10-18 DIAGNOSIS — Z86.69 PERSONAL HISTORY OF OTHER DISEASES OF THE NERVOUS SYSTEM AND SENSE ORGANS: ICD-10-CM

## 2023-10-18 DIAGNOSIS — Z78.9 OTHER SPECIFIED HEALTH STATUS: ICD-10-CM

## 2023-10-18 PROCEDURE — 99386 PREV VISIT NEW AGE 40-64: CPT | Mod: 25

## 2023-10-18 RX ORDER — ACETAMINOPHEN 325 MG/1
325 TABLET, FILM COATED ORAL
Refills: 0 | Status: ACTIVE | COMMUNITY

## 2024-08-05 NOTE — ED BEHAVIORAL HEALTH ASSESSMENT NOTE - NS ED B[facility] MED ROS RESPIRATORY
Refill Request     CONFIRM preferred pharmacy with the patient.    If Mail Order Rx - Pend for 90 day refill.      Last Seen: Last Seen Department: 6/13/2024  Last Seen by PCP: 5/1/2024    Last Written: 07/01/2024 4 mL 0 refills     If no future appointment scheduled:  Review the last OV with PCP and review information for follow-up visit,  Route STAFF MESSAGE with patient name to the  Pool for scheduling with the following information:            -  Timing of next visit           -  Visit type ie Physical, OV, etc           -  Diagnoses/Reason ie. COPD, HTN - Do not use MEDICATION, Follow-up or CHECK UP - Give reason for visit      Next Appointment:   Future Appointments   Date Time Provider Department Center   8/5/2024  3:00 PM Breezy Busby, DO [facility]       Message sent to  to schedule appt with patient?  NO      Requested Prescriptions     Pending Prescriptions Disp Refills    TRULICITY 0.75 MG/0.5ML SOPN SC injection [Pharmacy Med Name: Trulicity 0.75 MG/0.5ML Subcutaneous Solution Pen-injector] 4 mL 0     Sig: INJECT 0.5 ML INTO THE SKIN ONCE A WEEK       
No complaints

## 2024-12-12 NOTE — ED BEHAVIORAL HEALTH ASSESSMENT NOTE - NS ED B[facility] PLAN HOLD IN ED MEDICAL ISSUES2 FT
Patient: Ralph Soto    Procedure: Procedure(s):  HERNIORRHAPHY, VENTRAL, ROBOT-ASSISTED, LAPAROSCOPIC, USING DA HERI XI       Anesthesia Type:  General    Note:  Disposition: Outpatient   Postop Pain Control: Uneventful            Sign Out: Well controlled pain   PONV: No   Neuro/Psych: Uneventful            Sign Out: Acceptable/Baseline neuro status   Airway/Respiratory: Uneventful            Sign Out: Acceptable/Baseline resp. status   CV/Hemodynamics: Uneventful            Sign Out: Acceptable CV status; No obvious hypovolemia; No obvious fluid overload   Other NRE: NONE   DID A NON-ROUTINE EVENT OCCUR? No           Last vitals:  Vitals Value Taken Time   /70 12/12/24 1315   Temp 36.8  C (98.24  F) 12/12/24 1320   Pulse 80 12/12/24 1320   Resp 18 12/12/24 1320   SpO2 98 % 12/12/24 1320   Vitals shown include unfiled device data.    Electronically Signed By: Golden Moses MD  December 12, 2024  4:27 PM  
as above

## 2024-12-30 NOTE — ED ADULT NURSE REASSESSMENT NOTE - PERIPHERAL VASCULAR WDL
Pulses equal bilaterally, no edema present.
Pulses equal bilaterally, no edema present.
,[email or web address]_Unknown,[email or web address]

## 2025-01-28 NOTE — ED ADULT NURSE NOTE - PAIN RATING/NUMBER SCALE (0-10): REST
ERP at bedside. Pt agrees with plan of care discussed by ERP. AIDET acknowledged with patient. Mina in low position, side rail up for pt safety. Call light within reach. Will continue to monitor.   IV established. Blood sent to lab.   IV placed and BC x 2/2 drawn.    Med rec updated and complete  Allergies reviewed  Pt had a list of medications that he read to this writer  Pt reports no vitamins   Pt reports no antibiotics in the last 2 weeks, pt started DOXYCYCLINE 100MG 1 tablet BID on 12/8/2020 for 10 day course, pt did finish course   Report from ZEHRA Alatorre.   yes... 8
